# Patient Record
Sex: FEMALE | Race: WHITE | NOT HISPANIC OR LATINO
[De-identification: names, ages, dates, MRNs, and addresses within clinical notes are randomized per-mention and may not be internally consistent; named-entity substitution may affect disease eponyms.]

---

## 2018-08-23 ENCOUNTER — APPOINTMENT (OUTPATIENT)
Dept: CARDIOLOGY | Facility: CLINIC | Age: 68
End: 2018-08-23

## 2018-09-07 ENCOUNTER — EMERGENCY (EMERGENCY)
Facility: HOSPITAL | Age: 68
LOS: 1 days | Discharge: HOME | End: 2018-09-07
Attending: EMERGENCY MEDICINE | Admitting: EMERGENCY MEDICINE
Payer: MEDICARE

## 2018-09-07 VITALS
DIASTOLIC BLOOD PRESSURE: 70 MMHG | TEMPERATURE: 99 F | SYSTOLIC BLOOD PRESSURE: 127 MMHG | HEART RATE: 71 BPM | RESPIRATION RATE: 16 BRPM | OXYGEN SATURATION: 97 %

## 2018-09-07 VITALS
WEIGHT: 160.06 LBS | DIASTOLIC BLOOD PRESSURE: 90 MMHG | HEART RATE: 71 BPM | TEMPERATURE: 98 F | OXYGEN SATURATION: 100 % | RESPIRATION RATE: 18 BRPM | HEIGHT: 65 IN | SYSTOLIC BLOOD PRESSURE: 171 MMHG

## 2018-09-07 PROCEDURE — 73562 X-RAY EXAM OF KNEE 3: CPT | Mod: 26,RT

## 2018-09-07 PROCEDURE — 93971 EXTREMITY STUDY: CPT

## 2018-09-07 PROCEDURE — 93971 EXTREMITY STUDY: CPT | Mod: 26,RT

## 2018-09-07 PROCEDURE — 99284 EMERGENCY DEPT VISIT MOD MDM: CPT

## 2018-09-07 PROCEDURE — 73562 X-RAY EXAM OF KNEE 3: CPT

## 2018-09-07 PROCEDURE — 99284 EMERGENCY DEPT VISIT MOD MDM: CPT | Mod: 25

## 2018-09-07 RX ORDER — IBUPROFEN 200 MG
400 TABLET ORAL ONCE
Qty: 0 | Refills: 0 | Status: COMPLETED | OUTPATIENT
Start: 2018-09-07 | End: 2018-09-07

## 2018-09-07 RX ORDER — OXYCODONE HYDROCHLORIDE 5 MG/1
1 TABLET ORAL
Qty: 12 | Refills: 0
Start: 2018-09-07 | End: 2018-09-09

## 2018-09-07 RX ADMIN — Medication 400 MILLIGRAM(S): at 12:27

## 2018-09-07 RX ADMIN — Medication 400 MILLIGRAM(S): at 11:39

## 2018-09-07 NOTE — ED ADULT NURSE NOTE - OBJECTIVE STATEMENT
pt AO x3, ambulatory on her baseline, c/o atraumatic right popliteal pain for few weeks and getting worse today. Denies chest pain, dizziness, SOB or n/v at this time. Evaluated by provider. Xray on schedule. Due medication will be given as ordered. for pain. Awaiting further study. Will continue to monitor.

## 2018-09-07 NOTE — ED ADULT NURSE NOTE - NSIMPLEMENTINTERV_GEN_ALL_ED
Implemented All Universal Safety Interventions:  [place] to call system. Call bell, personal items and telephone within reach. Instruct patient to call for assistance. Room bathroom lighting operational. Non-slip footwear when patient is off stretcher. Physically safe environment: no spills, clutter or unnecessary equipment. Stretcher in lowest position, wheels locked, appropriate side rails in place.

## 2018-09-07 NOTE — ED PROVIDER NOTE - OBJECTIVE STATEMENT
Dr. Kaba: 68F no PMHx p/w atraumatic R popliteal pain x several weeks, worse today. Taking APAP with some relief. No chest pain or sob. No travel, no personal or fam h/o DVT/PE. No fevers or chills.

## 2018-09-07 NOTE — ED PROVIDER NOTE - ATTENDING CONTRIBUTION TO CARE
Dr. Kaba: I performed a face to face bedside interview with patient regarding history of present illness, review of symptoms and past medical history. I completed an independent physical exam.  I have discussed patient's plan of care with PA.   I agree with note as stated above, having amended the EMR as needed to reflect my findings.   This includes HISTORY OF PRESENT ILLNESS, HIV, PAST MEDICAL/SURGICAL/FAMILY/SOCIAL HISTORY, ALLERGIES AND HOME MEDICATIONS, REVIEW OF SYSTEMS, PHYSICAL EXAM, and any PROGRESS NOTES during the time I functioned as the attending physician for this patient.    Dr. Kaba: This H&P has been written by myself in its entirety

## 2018-09-20 ENCOUNTER — APPOINTMENT (OUTPATIENT)
Dept: ORTHOPEDIC SURGERY | Facility: CLINIC | Age: 68
End: 2018-09-20

## 2020-11-09 ENCOUNTER — APPOINTMENT (OUTPATIENT)
Dept: PHYSICAL MEDICINE AND REHAB | Facility: CLINIC | Age: 70
End: 2020-11-09
Payer: MEDICARE

## 2020-11-09 VITALS
OXYGEN SATURATION: 96 % | DIASTOLIC BLOOD PRESSURE: 95 MMHG | HEIGHT: 64 IN | HEART RATE: 97 BPM | TEMPERATURE: 98 F | SYSTOLIC BLOOD PRESSURE: 154 MMHG | BODY MASS INDEX: 27.83 KG/M2 | WEIGHT: 163 LBS

## 2020-11-09 DIAGNOSIS — Z80.0 FAMILY HISTORY OF MALIGNANT NEOPLASM OF DIGESTIVE ORGANS: ICD-10-CM

## 2020-11-09 DIAGNOSIS — R29.898 OTHER SYMPTOMS AND SIGNS INVOLVING THE MUSCULOSKELETAL SYSTEM: ICD-10-CM

## 2020-11-09 DIAGNOSIS — Z87.09 PERSONAL HISTORY OF OTHER DISEASES OF THE RESPIRATORY SYSTEM: ICD-10-CM

## 2020-11-09 DIAGNOSIS — Z78.9 OTHER SPECIFIED HEALTH STATUS: ICD-10-CM

## 2020-11-09 DIAGNOSIS — Z80.42 FAMILY HISTORY OF MALIGNANT NEOPLASM OF PROSTATE: ICD-10-CM

## 2020-11-09 DIAGNOSIS — Z86.79 PERSONAL HISTORY OF OTHER DISEASES OF THE CIRCULATORY SYSTEM: ICD-10-CM

## 2020-11-09 PROCEDURE — 99072 ADDL SUPL MATRL&STAF TM PHE: CPT

## 2020-11-09 PROCEDURE — 99203 OFFICE O/P NEW LOW 30 MIN: CPT

## 2020-11-10 PROBLEM — R29.898 COMPLAINTS OF LEG WEAKNESS: Status: ACTIVE | Noted: 2020-11-10

## 2020-11-10 RX ORDER — CHROMIUM 200 MCG
TABLET ORAL
Refills: 0 | Status: ACTIVE | COMMUNITY

## 2020-11-10 RX ORDER — VALSARTAN 320 MG/1
320 TABLET ORAL
Refills: 0 | Status: ACTIVE | COMMUNITY

## 2020-11-10 RX ORDER — ALPRAZOLAM 0.5 MG/1
0.5 TABLET ORAL
Refills: 0 | Status: ACTIVE | COMMUNITY

## 2020-11-10 RX ORDER — SERTRALINE HYDROCHLORIDE 100 MG/1
100 TABLET, FILM COATED ORAL
Refills: 0 | Status: ACTIVE | COMMUNITY

## 2020-11-10 NOTE — REVIEW OF SYSTEMS
[Negative] : Psychiatric [Recent Change In Weight] : ~T no recent weight change [Incontinence] : no incontinence [Joint Pain] : no joint pain [Joint Stiffness] : no joint stiffness [Skin Rash] : no skin rash [Headache] : no headaches [Dizziness] : no dizziness [Difficulty Walking] : no difficulty walking

## 2020-11-10 NOTE — ASSESSMENT
[FreeTextEntry1] : Ms. Ambrosio is a 70 year old female with 2 year history of left leg weakness that is now increased with recent falls. \par Discussed with patient differential diagnosis of spine related pathology versus peripheral nerve. \par Recommend basic laboratory including CBC, CMP, Vitamin B12, folate levels to be checked \par MRI lumbosacral spine \par Discussed with patient that she may need NCV/EMG \par PT to improve gait, balance and strengthening. Continue cane for walking.

## 2020-11-10 NOTE — HISTORY OF PRESENT ILLNESS
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 She states that her symptoms have been going on for more than 2 years. Due to her recent falls- 2 in a span of 1 month, she decided to pursue medical help. She had another fall remotely. She states that she trips and this results in a fall. She reports lower back pain about 4-5 months ago, but denies any recent back pain. She also reports left leg and arm tingling. Denies any weakness in left arm. She has been using a cane recently, but states that this is more to support the torn meniscus in right knee. If she is tired, she feels that her left leg drags. She denies any bladder or bowel issues.

## 2020-11-10 NOTE — PHYSICAL EXAM
[Normal] : Alert and in no acute distress [de-identified] : SANDRITA [de-identified] : R [de-identified] : Breathing comfrotably  [de-identified] : UE ROM WFL, LROM WFL, motor UE 5/5, Right LE 5/5, left HF 4+/5, abduction and adduction 5/5, Left knee extension: 5/5, Ankle DF appears mildly weak with repeated testing, EHL 5/5, Sensory inatct to light touch,  Proprioception ( hesitancy noted on left side, but accurate responses) Reflexes 2+ at biceps, BR, triceps, knees and ankles. Gait: mild hip hiking on left with reduced ankle range. Patient has difficulty standing on heels and toes unsupported, Romberg positve,  [de-identified] : No edema or calf tenderness

## 2020-11-10 NOTE — SOCIAL HISTORY
[Cane] : ~T cane [FreeTextEntry6] : She lives with family, in a house. She is independent with ADLs, \par + Driving

## 2020-12-07 NOTE — ED ADULT NURSE NOTE - NSHISCREENINGQ1_ED_A_ED
No [Questionnaire Received] : Patient questionnaire received [Intake Form Reviewed] : Patient intake form with past medical history, surgical history, family history and social history reviewed today [Urinary Incontinence] : urinary incontinence [Pelvic Strengthening] : pelvic strengthening

## 2021-12-16 ENCOUNTER — OUTPATIENT (OUTPATIENT)
Dept: OUTPATIENT SERVICES | Facility: HOSPITAL | Age: 71
LOS: 1 days | End: 2021-12-16
Payer: MEDICARE

## 2021-12-16 ENCOUNTER — APPOINTMENT (OUTPATIENT)
Dept: MRI IMAGING | Facility: CLINIC | Age: 71
End: 2021-12-16
Payer: MEDICARE

## 2021-12-16 DIAGNOSIS — Z00.8 ENCOUNTER FOR OTHER GENERAL EXAMINATION: ICD-10-CM

## 2021-12-16 PROCEDURE — 72141 MRI NECK SPINE W/O DYE: CPT | Mod: 26

## 2021-12-16 PROCEDURE — 72141 MRI NECK SPINE W/O DYE: CPT

## 2022-03-18 ENCOUNTER — TRANSCRIPTION ENCOUNTER (OUTPATIENT)
Age: 72
End: 2022-03-18

## 2022-05-16 ENCOUNTER — APPOINTMENT (OUTPATIENT)
Dept: ULTRASOUND IMAGING | Facility: HOSPITAL | Age: 72
End: 2022-05-16
Payer: MEDICARE

## 2022-05-16 ENCOUNTER — OUTPATIENT (OUTPATIENT)
Dept: OUTPATIENT SERVICES | Facility: HOSPITAL | Age: 72
LOS: 1 days | End: 2022-05-16
Payer: MEDICARE

## 2022-05-16 DIAGNOSIS — Z00.8 ENCOUNTER FOR OTHER GENERAL EXAMINATION: ICD-10-CM

## 2022-05-16 PROCEDURE — 76536 US EXAM OF HEAD AND NECK: CPT

## 2022-05-16 PROCEDURE — 76536 US EXAM OF HEAD AND NECK: CPT | Mod: 26

## 2023-03-21 ENCOUNTER — EMERGENCY (EMERGENCY)
Facility: HOSPITAL | Age: 73
LOS: 1 days | Discharge: HOME | End: 2023-03-21
Attending: EMERGENCY MEDICINE | Admitting: EMERGENCY MEDICINE
Payer: MEDICARE

## 2023-03-21 VITALS
HEART RATE: 76 BPM | RESPIRATION RATE: 16 BRPM | TEMPERATURE: 98 F | DIASTOLIC BLOOD PRESSURE: 74 MMHG | SYSTOLIC BLOOD PRESSURE: 109 MMHG | OXYGEN SATURATION: 98 %

## 2023-03-21 VITALS
TEMPERATURE: 98 F | SYSTOLIC BLOOD PRESSURE: 112 MMHG | HEART RATE: 76 BPM | DIASTOLIC BLOOD PRESSURE: 79 MMHG | OXYGEN SATURATION: 97 % | RESPIRATION RATE: 16 BRPM

## 2023-03-21 PROCEDURE — 73030 X-RAY EXAM OF SHOULDER: CPT

## 2023-03-21 PROCEDURE — 99285 EMERGENCY DEPT VISIT HI MDM: CPT | Mod: 25

## 2023-03-21 PROCEDURE — 99285 EMERGENCY DEPT VISIT HI MDM: CPT

## 2023-03-21 PROCEDURE — 73070 X-RAY EXAM OF ELBOW: CPT | Mod: 26,RT

## 2023-03-21 PROCEDURE — 73030 X-RAY EXAM OF SHOULDER: CPT | Mod: 26,RT,76

## 2023-03-21 PROCEDURE — 73060 X-RAY EXAM OF HUMERUS: CPT | Mod: 26,RT

## 2023-03-21 PROCEDURE — 73070 X-RAY EXAM OF ELBOW: CPT

## 2023-03-21 PROCEDURE — 73060 X-RAY EXAM OF HUMERUS: CPT

## 2023-03-21 PROCEDURE — 24505 CLTX HUMRL SHFT FX W/MNPJ: CPT | Mod: RT

## 2023-03-21 RX ORDER — IBUPROFEN 200 MG
1 TABLET ORAL
Qty: 28 | Refills: 0
Start: 2023-03-21 | End: 2023-03-27

## 2023-03-21 RX ORDER — IBUPROFEN 200 MG
600 TABLET ORAL ONCE
Refills: 0 | Status: COMPLETED | OUTPATIENT
Start: 2023-03-21 | End: 2023-03-21

## 2023-03-21 RX ORDER — DIAZEPAM 5 MG
2 TABLET ORAL ONCE
Refills: 0 | Status: DISCONTINUED | OUTPATIENT
Start: 2023-03-21 | End: 2023-03-21

## 2023-03-21 RX ORDER — VALSARTAN 80 MG/1
0 TABLET ORAL
Qty: 0 | Refills: 0 | DISCHARGE

## 2023-03-21 RX ORDER — MORPHINE SULFATE 50 MG/1
4 CAPSULE, EXTENDED RELEASE ORAL ONCE
Refills: 0 | Status: DISCONTINUED | OUTPATIENT
Start: 2023-03-21 | End: 2023-03-21

## 2023-03-21 RX ORDER — OXYCODONE AND ACETAMINOPHEN 5; 325 MG/1; MG/1
1 TABLET ORAL
Qty: 20 | Refills: 0
Start: 2023-03-21 | End: 2023-03-25

## 2023-03-21 RX ADMIN — Medication 600 MILLIGRAM(S): at 11:32

## 2023-03-21 RX ADMIN — MORPHINE SULFATE 4 MILLIGRAM(S): 50 CAPSULE, EXTENDED RELEASE ORAL at 13:08

## 2023-03-21 RX ADMIN — Medication 2 MILLIGRAM(S): at 13:01

## 2023-03-21 NOTE — ED PROVIDER NOTE - OBJECTIVE STATEMENT
71yo F PMHx MS presents to ED c/o right upper arm pain. States she was opening car door when she felt pain to R shoulder and mid-upper arm. No fall or trauma to area. Pt states she is unable to move arm without significant pain to area. Denies numbness, tingling, weakness, neck pain, fall, trauma, anticoagulation use, cp, sob.

## 2023-03-21 NOTE — ED PROVIDER NOTE - CLINICAL SUMMARY MEDICAL DECISION MAKING FREE TEXT BOX
71yo F presenting to ED c/o right upper arm pain after trying to open car door. XR shoulder, humerus 73yo F presenting to ED c/o right upper arm pain after trying to open car door. XR shoulder, humerus.      Patient is a 72-year-old white female with a history of multiple sclerosis who developed acute onset of right arm pain immediately upon opening car door.  Patient did not fall.  There was no direct trauma to the arm.  No numbness tingling to the arm.  This case will require evaluation as well as imaging followed by orthopedic consultation.

## 2023-03-21 NOTE — ED ADULT TRIAGE NOTE - AS PAIN REST
Post-Care Instructions: I reviewed with the patient in detail post-care instructions. Patient is to wear sunprotection, and avoid picking at any of the treated lesions. Pt may apply Vaseline to crusted or scabbing areas.
Detail Level: Detailed
Consent: The patient's consent was obtained including but not limited to risks of crusting, scabbing, blistering, scarring, darker or lighter pigmentary change, recurrence, incomplete removal and infection. The patient understands that the procedure is cosmetic in nature and is not covered by insurance.
Render Post-Care Instructions In Note?: yes
5 (moderate pain)

## 2023-03-21 NOTE — ED PROVIDER NOTE - PHYSICAL EXAMINATION
Gen: No acute distress, non toxic  HENT: NCAT, Mucous membranes moist, Oropharynx without exudates, uvula midline  Eyes: pink conjunctivae, EOMI, PERRL  CV: RRR, nl s1/s2.  Resp: CTAB, normal rate and effort  Neuro: A&O x 3, CN II-XII intact, sensorimotor intact without deficits, 5/5  str.   MSK: no midline c-spine ttp. Holding RUE fixed at side, flexed at elbow. +Pain with active and passive shoulder flexion. +TTP R upper arm/mid-humerus.   Vasc: Radial pulses 2+ b/l  Skin: No rashes. intact and perfused.

## 2023-03-21 NOTE — ED PROVIDER NOTE - NSFOLLOWUPINSTRUCTIONS_ED_ALL_ED_FT
- Return to the ED for any new or worsening symptoms.   - Follow-up with orthopedist for further evaluation and management of fracture    Fracture    A fracture is a break in one of your bones. This can occur from a variety of injuries, especially traumatic ones. Symptoms include pain, bruising, or swelling. Do not use the injured limb. If a fracture is in one of the bones below your waist, do not put weight on that limb unless instructed to do so by your healthcare provider. Crutches or a cane may have been provided. A splint or cast may have been applied by your health care provider. Make sure to keep it dry and follow up with an orthopedist as instructed.    SEEK IMMEDIATE MEDICAL CARE IF YOU HAVE ANY OF THE FOLLOWING SYMPTOMS: numbness, tingling, increasing pain, or weakness in any part of the injured limb. - Return to the ED for any new or worsening symptoms.   - Follow-up with orthopedist for further evaluation and management of fracture  - Remain non-weightbearing to right arm  - Remain in splint    Fracture    A fracture is a break in one of your bones. This can occur from a variety of injuries, especially traumatic ones. Symptoms include pain, bruising, or swelling. Do not use the injured limb. If a fracture is in one of the bones below your waist, do not put weight on that limb unless instructed to do so by your healthcare provider. Crutches or a cane may have been provided. A splint or cast may have been applied by your health care provider. Make sure to keep it dry and follow up with an orthopedist as instructed.    SEEK IMMEDIATE MEDICAL CARE IF YOU HAVE ANY OF THE FOLLOWING SYMPTOMS: numbness, tingling, increasing pain, or weakness in any part of the injured limb.

## 2023-03-21 NOTE — ED PROVIDER NOTE - WET READ LAUNCH FT
There are no Wet Read(s) to document. Patent There are 2 Wet Read(s) to document. There are 3 Wet Read(s) to document.

## 2023-03-21 NOTE — ED PROVIDER NOTE - NS ED ROS FT
Gen: denies fever, chills, fatigue, weight loss  Skin: denies rashes, laceration, bruising  HEENT: denies visual changes, ear pain, nasal congestion, throat pain  Respiratory: denies SALOMON, SOB, cough, wheezing  Cardiovascular: denies chest pain, palpitations, diaphoresis, LE edema  MSK: +R arm pain. Denies back pain, neck pain  Neuro: denies headache, dizziness, weakness, numbness

## 2023-03-21 NOTE — ED PROVIDER NOTE - PROGRESS NOTE DETAILS
ortho consulted for humerus fx. Pt reduced and braced by orthopedics. Post-reduction xrays demonstrate improved alignment. pt has orthopedist to f/u with.

## 2023-03-21 NOTE — ED PROVIDER NOTE - PATIENT PORTAL LINK FT
You can access the FollowMyHealth Patient Portal offered by [facility] by registering at the following website: [email or web address]. By joining [employer]’s FollowMyHealth portal, you will also be able to view your health information using other applications (apps) compatible with our system.

## 2023-03-21 NOTE — CONSULT NOTE ADULT - ATTENDING COMMENTS
73yo F with right humeral shaft fracture s/p injury earlier today. She has a history of MS.    On exam, TTP over right humeral shaft. Skin intact. No tenting. No open wounds. SILT over radial, ulnar, median, axillary nerve distributions. 2+ radial pulse. CR < 2s. No signs or symptoms of compartment syndrome.    X-rays show a multifragmentary humeral spiral fracture with varus alignment and encroaching on the humeral head.    RUE coaptation splint was applied after closed reduction. Post-reduction x-rays showed better alignment.     Plan is to followup closely as outpatient. Can discuss surgical versus nonop management.

## 2023-03-21 NOTE — ED PROVIDER NOTE - ATTENDING APP SHARED VISIT CONTRIBUTION OF CARE
Examination reveals a well-developed well-nourished white older female in no mild distress complaining of right arm pain examination of which reveals moderate tenderness to palpation with swelling right arm with intact neurovascular right upper extremity.  I agree with plan management outlined by PA.

## 2023-03-21 NOTE — ED ADULT NURSE NOTE - OBJECTIVE STATEMENT
pt was opening car door when arm gave out. fell into car on right arm. did not fall to floor, hit head, or have loc.  pt experiencing severe pain when moving arm. strong radial pulses present. equal  strength. no major deformities noted to eye. pt reports hx of MS.

## 2023-03-21 NOTE — CONSULT NOTE ADULT - SUBJECTIVE AND OBJECTIVE BOX
**** CHARTING IN PROGRESS ****     72y Female presents with R upper arm pain since this morning. RHD. Community ambulator w/ cane at baseline. Pt states she opened the car door and noticed immediate sharp pain in Right upper arm and shoulder. No trauma or fall. No hx of injury to right upper arm in the past. No hx of malignancy. No other injuries or complaints. Denies CP, SOB, fever, chills, numbness/tingling, weakness or any other complaints.     PAST MEDICAL & SURGICAL HISTORY:  No pertinent past medical history    Multiple sclerosis      No significant past surgical histor      Home Medications:  valsartan:  (21 Mar 2023 11:40)    Allergies    penicillins (Unknown)  sulfa drugs (Unknown)    Intolerances    Vital Signs Last 24 Hrs  T(C): 36.7 (21 Mar 2023 11:10), Max: 36.7 (21 Mar 2023 11:10)  T(F): 98 (21 Mar 2023 11:10), Max: 98 (21 Mar 2023 11:10)  HR: 76 (21 Mar 2023 11:10) (76 - 76)  BP: 112/79 (21 Mar 2023 11:10) (112/79 - 112/79)  BP(mean): --  RR: 16 (21 Mar 2023 11:10) (16 - 16)  SpO2: 97% (21 Mar 2023 11:10) (97% - 97%)    Parameters below as of 21 Mar 2023 11:10  Patient On (Oxygen Delivery Method): room air    PHYSICAL EXAM  General: NAD, Awake and Alert  RUE:   Skin intact  Moderate diffuse swelling at right upper arm w/ associated TTP  No TTP elsewhere along extremity  + AIN/PIN/U/M/Musc/Ax  2+ Radial  compartments soft and compressible      IMAGING:  XR : R humeral shaft fx    Assessment/Plan:  72y Female with Right humeral shaft fx.     - Reduced and placed in coaptation splint. NWB RUE in sling and splint.   -Pain control as needed  -DVT ppx per primary  - Ortho stable for dc  - Follow up w/ Dr. Finch in office in 1-2 weeks. Please call office for appointment.   -Will discuss plan w/ Dr. Finch and change accordingly.       **** CHARTING IN PROGRESS **** 72y Female presents with R upper arm pain since this morning. RHD. Community ambulator w/ cane at baseline. Pt states she opened the car door and noticed immediate sharp pain in Right upper arm and shoulder. No trauma or fall. No hx of injury to right upper arm in the past. No hx of malignancy. No other injuries or complaints. Denies CP, SOB, fever, chills, numbness/tingling, weakness or any other complaints.     PAST MEDICAL & SURGICAL HISTORY:  No pertinent past medical history    Multiple sclerosis      No significant past surgical histor      Home Medications:  valsartan:  (21 Mar 2023 11:40)    Allergies    penicillins (Unknown)  sulfa drugs (Unknown)    Intolerances    Vital Signs Last 24 Hrs  T(C): 36.7 (21 Mar 2023 11:10), Max: 36.7 (21 Mar 2023 11:10)  T(F): 98 (21 Mar 2023 11:10), Max: 98 (21 Mar 2023 11:10)  HR: 76 (21 Mar 2023 11:10) (76 - 76)  BP: 112/79 (21 Mar 2023 11:10) (112/79 - 112/79)  BP(mean): --  RR: 16 (21 Mar 2023 11:10) (16 - 16)  SpO2: 97% (21 Mar 2023 11:10) (97% - 97%)    Parameters below as of 21 Mar 2023 11:10  Patient On (Oxygen Delivery Method): room air    PHYSICAL EXAM  General: NAD, Awake and Alert  RUE:   Skin intact  Moderate diffuse swelling at right upper arm w/ associated TTP  No TTP elsewhere along extremity  + AIN/PIN/U/M/Musc/Ax  2+ Radial  compartments soft and compressible      IMAGING:  XR : R humeral shaft fx    Assessment/Plan:  72y Female with Right humeral shaft fx.     - Reduced and placed in coaptation splint. NWB RUE in sling and splint.   -Pain control as needed  -DVT ppx per primary  - Ortho stable for dc  - Follow up w/ Dr. Finch in office in 1-2 weeks. Please call office for appointment.   - Discussed plan w/ Dr. Finch who agrees.  72y Female presents with R upper arm pain since this morning. RHD. Community ambulator w/ cane at baseline. Pt states she opened the car door and noticed immediate sharp pain in Right upper arm and shoulder. No trauma or fall. No hx of injury to right upper arm in the past. No hx of malignancy. No other injuries or complaints. Denies CP, SOB, fever, chills, numbness/tingling, weakness or any other complaints.     PAST MEDICAL & SURGICAL HISTORY:  No pertinent past medical history    Multiple sclerosis      No significant past surgical histor      Home Medications:  valsartan:  (21 Mar 2023 11:40)    Allergies    penicillins (Unknown)  sulfa drugs (Unknown)    Intolerances    Vital Signs Last 24 Hrs  T(C): 36.7 (21 Mar 2023 11:10), Max: 36.7 (21 Mar 2023 11:10)  T(F): 98 (21 Mar 2023 11:10), Max: 98 (21 Mar 2023 11:10)  HR: 76 (21 Mar 2023 11:10) (76 - 76)  BP: 112/79 (21 Mar 2023 11:10) (112/79 - 112/79)  BP(mean): --  RR: 16 (21 Mar 2023 11:10) (16 - 16)  SpO2: 97% (21 Mar 2023 11:10) (97% - 97%)    Parameters below as of 21 Mar 2023 11:10  Patient On (Oxygen Delivery Method): room air    PHYSICAL EXAM  General: NAD, Awake and Alert  RUE:   Skin intact  Moderate diffuse swelling at right upper arm w/ associated TTP  No TTP elsewhere along extremity  + AIN/PIN/U/M/Musc/Ax  2+ Radial  compartments soft and compressible      IMAGING:  XR : R humeral shaft fx    Assessment/Plan:  72y Female with Right humeral shaft fx.     - Reduced and placed in coaptation splint. NWB RUE in sling and splint.   - Pain control as needed  - DVT ppx per primary  - Ortho stable for dc  - Follow up w/ Dr. Finch in office in 1-2 weeks. Please call office for appointment.   - Discussed plan w/ Dr. Finch who agrees.

## 2023-03-21 NOTE — ED PROVIDER NOTE - CARE PROVIDER_API CALL
Curtis García)  Orthopaedic Surgery  [address], [address]  [place] [address]  Phone: [phone]  Fax: [phone]  Follow Up Time:     Barry Finch)  Jason Leblanc  Physicians  [facility] [place] [address]  Phone: [phone]  Fax: [phone]  Follow Up Time:

## 2023-03-22 PROBLEM — G35 MULTIPLE SCLEROSIS: Chronic | Status: ACTIVE | Noted: 2023-03-21

## 2023-03-23 ENCOUNTER — APPOINTMENT (OUTPATIENT)
Dept: ORTHOPEDIC SURGERY | Facility: CLINIC | Age: 73
End: 2023-03-23
Payer: MEDICARE

## 2023-03-23 VITALS — WEIGHT: 148 LBS | HEIGHT: 65 IN | BODY MASS INDEX: 24.66 KG/M2

## 2023-03-23 PROCEDURE — 99204 OFFICE O/P NEW MOD 45 MIN: CPT

## 2023-03-24 NOTE — DISCUSSION/SUMMARY
[de-identified] : The underlying pathophysiology was reviewed with the patient. XR films were reviewed with the patient. Discussed at length the nature of the patient’s condition. The right upper extremity symptoms appear secondary to a displaced and comminuted fracture upper shaft right humerus\par \par At this time, we discussed treatment options consisting of operative and nonoperative management. I told her that the fracture is overall acceptably aligned and I would therefore recommended nonoperative management. She understands however that if it does show to displace with time, ORIF may be indicated at a later date. She will continue immobilized in the posterior splint rather than be transitioned to a humeral fracture brace as I discussed with her that it will likely be very painful to do so today. She was fitted with a new splint. I recommended continued ROM exercises of the wrist and digits to maintain motion and function. She was instructed to sleep either propped up in bed or in a recliner for comfort. I recommended Calcium Citrate, Vitamin D3 and Vitamin C to promote bone health and healing. She was instructed to wean off of Percocet and transition to Tylenol ES as well as Ibuprofen for pain.\par \par All questions answered, understanding verbalized. Patient in agreement with plan of care. Follow up in 2 weeks for repeat xrays and transition to a humeral fracture brace.

## 2023-03-24 NOTE — PHYSICAL EXAM
[de-identified] : Patient is WDWN, alert, and in no acute distress. Breathing is unlabored. She is grossly oriented to person, place, and time.\par \par She is accompanied by her  today. \par \par Right Upper Extremity:\par She presents immobilized in a posterior splint and sling, which she remained in for physical exam.\par ROM to the right shoulder was not accessed given injury and pain.\par Sensation intact along the deltoid.\par She has full writ and digital motion with intact sensation. \par  [de-identified] : EXAM: XR HUMERUS MIN 2 VIEWS RT\par EXAM: XR HUMERUS MIN 2 VIEWS RT\par EXAM: XR SHOULDER COMP MIN 2V RT\par EXAM: XR ELBOW POST RED AP LAT 2V RT\par PROCEDURE DATE: 03/21/2023\par IMPRESSION: \par Improved alignment of the humeral fracture and splinting.\par \par BRENNAN FISHER MD; Attending Radiologist\par This document has been electronically signed. Mar 21 2023 \par \par ------------------------------------------------------------------------------------------------------------------------------------------------------------------------------------\par \par EXAM: XR HUMERUS MIN 2 VIEWS RT ORDERED BY: DEBBY CLAROS\par EXAM: XR SHOULDER COMP MIN 2V RT ORDERED BY: DEBBY CLAROS\par PROCEDURE DATE: 03/21/2023\par IMPRESSION: \par Long segment comminuted fracture upper shaft right humerus with angulation and displacement.\par \par BRENNAN FISHER MD; Attending Radiologist\par This document has been electronically signed. Mar 21 2023 1:22PM

## 2023-03-24 NOTE — END OF VISIT
[FreeTextEntry3] : All medical record entries made by the Scribe were at my,  Dr. Curtis García MD., direction and personally dictated by me on 03/23/2023. I have personally reviewed the chart and agree that the record accurately reflects my personal performance of the history, physical exam, assessment and plan.

## 2023-03-24 NOTE — ADDENDUM
[FreeTextEntry1] : I, Eunice Bradshaw wrote this note acting as a scribe for Dr. Curtis García on Mar 23, 2023.

## 2023-04-10 ENCOUNTER — APPOINTMENT (OUTPATIENT)
Dept: ORTHOPEDIC SURGERY | Facility: CLINIC | Age: 73
End: 2023-04-10
Payer: MEDICARE

## 2023-04-10 PROCEDURE — 99213 OFFICE O/P EST LOW 20 MIN: CPT

## 2023-04-10 PROCEDURE — 73060 X-RAY EXAM OF HUMERUS: CPT

## 2023-04-10 NOTE — END OF VISIT
[FreeTextEntry3] : All medical record entries made by the Scribe were at my,  Dr. Curtis García MD., direction and personally dictated by me on 04/10/2023. I have personally reviewed the chart and agree that the record accurately reflects my personal performance of the history, physical exam, assessment and plan.

## 2023-04-10 NOTE — ADDENDUM
[FreeTextEntry1] : I, Eunice Bradshaw wrote this note acting as a scribe for Dr. Curtis García on Apr 10, 2023.

## 2023-04-10 NOTE — HISTORY OF PRESENT ILLNESS
[de-identified] : Pt is a RHD 71 y/o female for evaluation of right arm injury post opening her car door. She denies a fall. She states upon opening the car door, she felt an immediate crack in her arm. She went to [facility] and had x-rays done on 3/24/23 and revealed a comminuted fracture the upper shaft of the right humerus with displacement. She was initially seen in the office on 3/23/23 at which time I recommended nonoperative management due to the alignment of the fracture. She returns on 4/10/23 and is doing well. She notes little to no pain but states if she does require medication, she will take Tylenol. She states she experiences a clicking sensation. She is taking Calcium and Vitamin D. \par \par She was diagnosed with MS about 1 year ago but notes she ignored prior symptoms for about 20 years and sustained regular falls.

## 2023-04-10 NOTE — PHYSICAL EXAM
[de-identified] : Patient is WDWN, alert, and in no acute distress. Breathing is unlabored. She is grossly oriented to person, place, and time.\par \par She is accompanied by her  today. \par \par Right Upper Extremity:\par She presents immobilized in a posterior splint and sling, which she remained in for physical exam.\par ROM to the right shoulder was not accessed given injury and pain.\par Sensation intact along the deltoid.\par She has full writ and digital motion with intact sensation.  [de-identified] : AP and lateral views of the RIGHT humerus were obtained today and revealed a long segment comminuted fracture upper shaft right humerus.\par \par ------------------------------------------------------------------------------------------------------------------------------------------------------------------------------------ \par \par EXAM: XR HUMERUS MIN 2 VIEWS RT\par EXAM: XR HUMERUS MIN 2 VIEWS RT\par EXAM: XR SHOULDER COMP MIN 2V RT\par EXAM: XR ELBOW POST RED AP LAT 2V RT\par PROCEDURE DATE: 03/21/2023\par IMPRESSION: \par Improved alignment of the humeral fracture and splinting.\par \par BRENNAN FISHER MD; Attending Radiologist\par This document has been electronically signed. Mar 21 2023 \par \par ------------------------------------------------------------------------------------------------------------------------------------------------------------------------------------\par \par EXAM: XR HUMERUS MIN 2 VIEWS RT ORDERED BY: DEBBY CLAROS\par EXAM: XR SHOULDER COMP MIN 2V RT ORDERED BY: DEBBY CLAROS\par PROCEDURE DATE: 03/21/2023\par IMPRESSION: \par Long segment comminuted fracture upper shaft right humerus with angulation and displacement.\par \par BRENNAN FISHER MD; Attending Radiologist\par This document has been electronically signed. Mar 21 2023 1:22PM

## 2023-04-10 NOTE — DISCUSSION/SUMMARY
[de-identified] : The underlying pathophysiology was reviewed with the patient. XR films were reviewed with the patient. Discussed at length the nature of the patient’s condition. The right upper extremity symptoms appear secondary to a displaced and comminuted fracture upper shaft right humerus\par \par At this time, I discussed with her and her  that the fracture will take at a minimum 6 weeks to heal. I recommended continued nonoperative management based upon the alignment of the fracture as of today as well as maintenance of the splint as she is currently comfortable and notes a decrease in pain overall. At the 4 week anne, she will likely be transitioned to a humeral fracture brace.\par \par All questions answered, understanding verbalized. Patient in agreement with plan of care. Follow up in 2 weeks for repeat xrays.

## 2023-04-27 ENCOUNTER — APPOINTMENT (OUTPATIENT)
Dept: ORTHOPEDIC SURGERY | Facility: CLINIC | Age: 73
End: 2023-04-27
Payer: MEDICARE

## 2023-04-27 DIAGNOSIS — S42.309A UNSPECIFIED FRACTURE OF SHAFT OF HUMERUS, UNSPECIFIED ARM, INITIAL ENCOUNTER FOR CLOSED FRACTURE: ICD-10-CM

## 2023-04-27 PROCEDURE — 73060 X-RAY EXAM OF HUMERUS: CPT

## 2023-04-27 PROCEDURE — 99213 OFFICE O/P EST LOW 20 MIN: CPT

## 2023-04-27 NOTE — DISCUSSION/SUMMARY
[de-identified] : The underlying pathophysiology was reviewed with the patient. XR films were reviewed with the patient. Discussed at length the nature of the patient’s condition. The right upper extremity symptoms appear secondary to a displaced and comminuted fracture upper shaft right humerus\par \par At this time, I reviewed the xrays in the great detail with the patient and her . I explained to them, that based off of today's xrays, there is some evidence of healing seen and it is a good sign that she does not currently have pain. With that being said, the fracture is comminuted and displaced, resulting in a cosmetic deformity to the upper extremity overlying the humerus. We discussed further treatment options of operative versus nonoperative management. I explained to her and her  that the only way to correct the deformity and realign the fracture would be ORIF. She and her  stated they are not interested in surgery. Furthermore, she stated that she is not bothered by the deformity. She has therefore deferred operative management of ORIF and is not consenting to surgery. She has also accepted the deformity as of her office visit today on 4/27/23. Finally, I did explain to her and her  that there are no guarantees the fracture will heel with continued nonoperative management and if this is the case, ORIF may ultimately be indicated at a later point in time.\par \par All questions answered, understanding verbalized. Patient in agreement with plan of care. Follow up in 4 weeks for repeat xrays.

## 2023-04-27 NOTE — PHYSICAL EXAM
[de-identified] : Patient is WDWN, alert, and in no acute distress. Breathing is unlabored. She is grossly oriented to person, place, and time.\par \par She is accompanied by her  today. \par \par Right Upper Extremity:\par She presents immobilized in a posterior splint and sling, which were removed for physical exam.\par After the splint was removed, no evidence of skin breakdown or lesions.\par There is a prominence noted along the upper extremity, overlying the midshaft of the humerus, due to the displacement of the fracture.\par There is residual edema and ecchymosis noted diffusely to the upper extremity.\par ROM to the right shoulder was accessed passively, with \par ROM to the right elbow: limited given placement of the long arm splint for 5 weeks.\par Sensation intact along the deltoid.\par She has full wrist and digital motion with tingling to the digits. [de-identified] : AP and lateral views of the RIGHT humerus were obtained today and revealed a long segment comminuted fracture upper shaft right humerus. There are signs of interval healing. \par \par ------------------------------------------------------------------------------------------------------------------------------------------------------------------------------------ \par \par EXAM: XR HUMERUS MIN 2 VIEWS RT\par EXAM: XR HUMERUS MIN 2 VIEWS RT\par EXAM: XR SHOULDER COMP MIN 2V RT\par EXAM: XR ELBOW POST RED AP LAT 2V RT\par PROCEDURE DATE: 03/21/2023\par IMPRESSION: \par Improved alignment of the humeral fracture and splinting.\par \par BRENNAN FISHER MD; Attending Radiologist\par This document has been electronically signed. Mar 21 2023 \par \par ------------------------------------------------------------------------------------------------------------------------------------------------------------------------------------\par \par EXAM: XR HUMERUS MIN 2 VIEWS RT ORDERED BY: DEBBY CLAROS\par EXAM: XR SHOULDER COMP MIN 2V RT ORDERED BY: DEBBY CLAROS\angle PROCEDURE DATE: 03/21/2023\par IMPRESSION: \par Long segment comminuted fracture upper shaft right humerus with angulation and displacement.\par \par BRENNAN FISHER MD; Attending Radiologist\par This document has been electronically signed. Mar 21 2023 1:22PM

## 2023-04-27 NOTE — HISTORY OF PRESENT ILLNESS
[de-identified] : Pt is a RHD 71 y/o female for evaluation of right arm injury post opening her car door. She denies a fall. She states upon opening the car door, she felt an immediate crack in her arm. She went to [facility] and had x-rays done on 3/24/23 and revealed a comminuted fracture the upper shaft of the right humerus with displacement. She was initially seen in the office on 3/23/23 at which time I recommended nonoperative management due to the alignment of the fracture. She returned on 4/10/23 and was doing well. She noted little to no pain but stated if she did require medication, she would take Tylenol. She noted a clicking sensation. She returns on 4/27/23 for repeat xrays and reports to be improving. She still has limited motion to the shoulder actively. She reports tingling to her right hand. She denies pain and is no longer taking pain medication such as Tylenol.\par \par She was diagnosed with MS about 1 year ago but notes she ignored prior symptoms for about 20 years and sustained regular falls.

## 2023-04-27 NOTE — ADDENDUM
[FreeTextEntry1] : I, Eunice Bradshaw wrote this note acting as a scribe for Dr. Curtis García on Apr 27, 2023.

## 2023-04-27 NOTE — END OF VISIT
[FreeTextEntry3] : All medical record entries made by the Scribe were at my,  Dr. Curtis García MD., direction and personally dictated by me on 04/27/2023. I have personally reviewed the chart and agree that the record accurately reflects my personal performance of the history, physical exam, assessment and plan.

## 2023-05-30 ENCOUNTER — APPOINTMENT (OUTPATIENT)
Dept: ORTHOPEDIC SURGERY | Facility: CLINIC | Age: 73
End: 2023-05-30
Payer: MEDICARE

## 2023-05-30 VITALS — HEIGHT: 64 IN | WEIGHT: 143 LBS | BODY MASS INDEX: 24.41 KG/M2

## 2023-05-30 PROCEDURE — 99213 OFFICE O/P EST LOW 20 MIN: CPT

## 2023-05-30 PROCEDURE — 73060 X-RAY EXAM OF HUMERUS: CPT

## 2023-05-30 NOTE — HISTORY OF PRESENT ILLNESS
[de-identified] : Pt is a RHD 73 y/o female for evaluation of right arm injury post opening her car door. She denies a fall. She states upon opening the car door, she felt an immediate crack in her arm. She went to [facility] and had x-rays done on 3/24/23 and revealed a comminuted fracture the upper shaft of the right humerus with displacement. She was initially seen in the office on 3/23/23 at which time I recommended nonoperative management due to the alignment of the fracture. She has been following up since her initial visit with repeat xrays and has noted improvements. She returns for repeat xrays once again on 5/30/23 and is progressing well. She has not yet began PT but would like to go. She has improvements in motion since her last visit about 1 month ago. \par \par She was diagnosed with MS about 1 year ago but notes she ignored prior symptoms for about 20 years and sustained regular falls.

## 2023-05-30 NOTE — END OF VISIT
[FreeTextEntry3] : All medical record entries made by the Scribe were at my,  Dr. Curtis García MD., direction and personally dictated by me on 05/30/2023. I have personally reviewed the chart and agree that the record accurately reflects my personal performance of the history, physical exam, assessment and plan.

## 2023-05-30 NOTE — DISCUSSION/SUMMARY
[de-identified] : The underlying pathophysiology was reviewed with the patient. XR films were reviewed with the patient. Discussed at length the nature of the patient’s condition. The right upper extremity symptoms appear secondary to a displaced and comminuted fracture upper shaft right humerus\par \par At this time, I reviewed the xrays in the great detail with the patient and her . I explained to them that there are increased signs of healing along the fracture site, which is a great sign. I recommended she continue with ROM exercises of the shoulder and begin a course of physical therapy. I did tell her to use caution as again the fracture is not fully healed and if she were to fall, she is at risk for reinjury.\par The patient wishes to proceed with physical therapy. (ROM and strengthening as tolerated). A script was given.\par \par All questions answered, understanding verbalized. Patient in agreement with plan of care. Follow up in 3 months for repeat xrays.

## 2023-05-30 NOTE — PHYSICAL EXAM
[de-identified] : Patient is WDWN, alert, and in no acute distress. Breathing is unlabored. She is grossly oriented to person, place, and time.\par \par She is accompanied by her  today. \par \par Right Upper Extremity:\par She presents immobilized in a posterior splint and sling, which were removed for physical exam.\par After the splint was removed, no evidence of skin breakdown or lesions.\par There is a prominence noted along the upper extremity, overlying the midshaft of the humerus, due to the displacement of the fracture.\par There is residual edema and ecchymosis noted diffusely to the upper extremity.\par ROM to the right shoulder was accessed passively, with \par ROM to the right elbow: limited given placement of the long arm splint for 5 weeks.\par Sensation intact along the deltoid.\par She has full wrist and digital motion with tingling to the digits. [de-identified] : AP and lateral views of the RIGHT humerus were obtained today and revealed a long segment comminuted fracture upper shaft right humerus. There are signs of interval healing. \par \par ------------------------------------------------------------------------------------------------------------------------------------------------------------------------------------ \par \par EXAM: XR HUMERUS MIN 2 VIEWS RT\par EXAM: XR HUMERUS MIN 2 VIEWS RT\par EXAM: XR SHOULDER COMP MIN 2V RT\par EXAM: XR ELBOW POST RED AP LAT 2V RT\par PROCEDURE DATE: 03/21/2023\par IMPRESSION: \par Improved alignment of the humeral fracture and splinting.\par \par BRENNAN FISHER MD; Attending Radiologist\par This document has been electronically signed. Mar 21 2023 \par \par ------------------------------------------------------------------------------------------------------------------------------------------------------------------------------------\par \par EXAM: XR HUMERUS MIN 2 VIEWS RT ORDERED BY: DEBBY CLAROS\par EXAM: XR SHOULDER COMP MIN 2V RT ORDERED BY: DEBBY CLAROS\angle PROCEDURE DATE: 03/21/2023\par IMPRESSION: \par Long segment comminuted fracture upper shaft right humerus with angulation and displacement.\par \par BRENNAN FISHER MD; Attending Radiologist\par This document has been electronically signed. Mar 21 2023 1:22PM

## 2023-09-05 ENCOUNTER — APPOINTMENT (OUTPATIENT)
Dept: ORTHOPEDIC SURGERY | Facility: CLINIC | Age: 73
End: 2023-09-05
Payer: MEDICARE

## 2023-09-05 DIAGNOSIS — S42.361D: ICD-10-CM

## 2023-09-05 PROCEDURE — 73060 X-RAY EXAM OF HUMERUS: CPT

## 2023-09-05 PROCEDURE — 99213 OFFICE O/P EST LOW 20 MIN: CPT

## 2023-09-05 NOTE — HISTORY OF PRESENT ILLNESS
[de-identified] : Pt is a RHD 71 y/o female for evaluation of right arm injury post opening her car door. She denies a fall. She states upon opening the car door, she felt an immediate crack in her arm. She went to [facility] and had x-rays done on 3/24/23 and revealed a comminuted fracture the upper shaft of the right humerus with displacement. She was initially seen in the office on 3/23/23 at which time I recommended nonoperative management due to the alignment of the fracture. She has been following up since her initial visit with repeat xrays and has noted improvements. She returns for repeat xrays once again on 5/30/23 and is progressing well. She has not yet began PT but would like to go. She has improvements in motion since her last visit about 1 month ago. She returns today on 9/5/23 for a follow up. Patient claims to have improved and has no complaints.   She was diagnosed with MS about 1 year ago but notes she ignored prior symptoms for about 20 years and sustained regular falls.

## 2023-09-05 NOTE — END OF VISIT
[FreeTextEntry3] : All medical record entries made by the Scribe were at my, Dr. Curtis García MD., direction and personally dictated by me on 09/05/2023. I have personally reviewed the chart and agree that the record accurately reflects my personal performance of the history, physical exam, assessment and plan.

## 2023-09-05 NOTE — PHYSICAL EXAM
[de-identified] : Patient is WDWN, alert, and in no acute distress. Breathing is unlabored. She is grossly oriented to person, place, and time.\par  \par  She is accompanied by her  today. \par  \par  Right Upper Extremity:\par  She presents immobilized in a posterior splint and sling, which were removed for physical exam.\par  After the splint was removed, no evidence of skin breakdown or lesions.\par  There is a prominence noted along the upper extremity, overlying the midshaft of the humerus, due to the displacement of the fracture.\par  There is residual edema and ecchymosis noted diffusely to the upper extremity.\par  ROM to the right shoulder was accessed passively, with \par  ROM to the right elbow: limited given placement of the long arm splint for 5 weeks.\par  Sensation intact along the deltoid.\par  She has full wrist and digital motion with tingling to the digits. [de-identified] : AP, lateral and oblique views of the RIGHT humerus were obtained today and revealed a healed humerus. ---------------------------------------------------------------------------------------------------------------------------------------------------------------------------------- AP and lateral views of the RIGHT humerus were obtained today and revealed a long segment comminuted fracture upper shaft right humerus. There are signs of interval healing.   ------------------------------------------------------------------------------------------------------------------------------------------------------------------------------------   EXAM: XR HUMERUS MIN 2 VIEWS RT EXAM: XR HUMERUS MIN 2 VIEWS RT EXAM: XR SHOULDER COMP MIN 2V RT EXAM: XR ELBOW POST RED AP LAT 2V RT PROCEDURE DATE: 03/21/2023 IMPRESSION:  Improved alignment of the humeral fracture and splinting.  BRENNAN FISHER MD; Attending Radiologist This document has been electronically signed. Mar 21 2023   ------------------------------------------------------------------------------------------------------------------------------------------------------------------------------------  EXAM: XR HUMERUS MIN 2 VIEWS RT ORDERED BY: DEBBY CLAROS EXAM: XR SHOULDER COMP MIN 2V RT ORDERED BY: DEBBY CLAROS PROCEDURE DATE: 03/21/2023 IMPRESSION:  Long segment comminuted fracture upper shaft right humerus with angulation and displacement.  BRENNAN FISHER MD; Attending Radiologist This document has been electronically signed. Mar 21 2023 1:22PM

## 2023-09-05 NOTE — ADDENDUM
[FreeTextEntry1] : I, Giles Ramírez wrote this note acting as a scribe for Dr. Curtis García on Sep 05, 2023

## 2023-09-05 NOTE — DISCUSSION/SUMMARY
[de-identified] : The underlying pathophysiology was reviewed with the patient. XR films were reviewed with the patient. Discussed at length the nature of the patient's condition. The right upper extremity symptoms appear secondary to a displaced and comminuted fracture upper shaft right humerus  At this time, I reviewed the xrays in the great detail with the patient and her . I explained to them that there are increased signs of healing along the fracture site, which is a great sign. I recommended she continue with ROM exercises of the shoulder and begin a course of physical therapy. I did tell her to use caution as again the fracture is not fully healed and if she were to fall, she is at risk for reinjury. The patient wishes to proceed with physical therapy. (ROM and strengthening as tolerated). A script was given. During her follow up on 9/5/23 we discussed her new X rays and I told her everything looked to be completely healed and she can return to activities with no restriction,   All questions answered, understanding verbalized. Patient in agreement with plan of care. Follow up as needed.

## 2024-01-31 ENCOUNTER — APPOINTMENT (OUTPATIENT)
Dept: INTERNAL MEDICINE | Facility: CLINIC | Age: 74
End: 2024-01-31

## 2024-02-02 NOTE — ED PROVIDER NOTE - WR ORDER STATUS 2
[General Appearance - Well Developed] : well developed [Normal Appearance] : normal appearance [Well Groomed] : well groomed [General Appearance - Well Nourished] : well nourished [No Deformities] : no deformities [General Appearance - In No Acute Distress] : no acute distress [Heart Rate And Rhythm] : heart rate and rhythm were normal [Heart Sounds] : normal S1 and S2 [Murmurs] : no murmurs present [Respiration, Rhythm And Depth] : normal respiratory rhythm and effort [Exaggerated Use Of Accessory Muscles For Inspiration] : no accessory muscle use [Auscultation Breath Sounds / Voice Sounds] : lungs were clear to auscultation bilaterally [Clean] : clean [Dry] : dry [Well-Healed] : well-healed [Abdomen Soft] : soft [Abdomen Tenderness] : non-tender [Abdomen Mass (___ Cm)] : no abdominal mass palpated [Nail Clubbing] : no clubbing of the fingernails [Cyanosis, Localized] : no localized cyanosis [Petechial Hemorrhages (___cm)] : no petechial hemorrhages [] : no ischemic changes Performed

## 2025-01-20 ENCOUNTER — APPOINTMENT (OUTPATIENT)
Dept: INTERNAL MEDICINE | Facility: CLINIC | Age: 75
End: 2025-01-20

## 2025-05-18 ENCOUNTER — EMERGENCY (EMERGENCY)
Facility: HOSPITAL | Age: 75
LOS: 1 days | End: 2025-05-18
Attending: INTERNAL MEDICINE | Admitting: INTERNAL MEDICINE
Payer: MEDICARE

## 2025-05-18 VITALS
TEMPERATURE: 98 F | HEART RATE: 64 BPM | OXYGEN SATURATION: 99 % | RESPIRATION RATE: 16 BRPM | WEIGHT: 145.06 LBS | HEIGHT: 62 IN | DIASTOLIC BLOOD PRESSURE: 62 MMHG | SYSTOLIC BLOOD PRESSURE: 145 MMHG

## 2025-05-18 LAB
APPEARANCE UR: ABNORMAL
BILIRUB UR-MCNC: NEGATIVE — SIGNIFICANT CHANGE UP
COLOR SPEC: SIGNIFICANT CHANGE UP
DIFF PNL FLD: ABNORMAL
GLUCOSE UR QL: NEGATIVE MG/DL — SIGNIFICANT CHANGE UP
KETONES UR QL: NEGATIVE MG/DL — SIGNIFICANT CHANGE UP
LEUKOCYTE ESTERASE UR-ACNC: ABNORMAL
NITRITE UR-MCNC: NEGATIVE — SIGNIFICANT CHANGE UP
PH UR: 7.5 — SIGNIFICANT CHANGE UP (ref 5–8)
PROT UR-MCNC: 100 MG/DL
SP GR SPEC: 1.02 — SIGNIFICANT CHANGE UP (ref 1–1.03)
UROBILINOGEN FLD QL: 0.2 MG/DL — SIGNIFICANT CHANGE UP (ref 0.2–1)

## 2025-05-18 PROCEDURE — 87086 URINE CULTURE/COLONY COUNT: CPT

## 2025-05-18 PROCEDURE — 99283 EMERGENCY DEPT VISIT LOW MDM: CPT | Mod: 25

## 2025-05-18 PROCEDURE — 81001 URINALYSIS AUTO W/SCOPE: CPT

## 2025-05-18 PROCEDURE — 99284 EMERGENCY DEPT VISIT MOD MDM: CPT

## 2025-05-18 RX ORDER — CEFUROXIME SODIUM 1.5 G
1 VIAL (EA) INJECTION
Qty: 20 | Refills: 0
Start: 2025-05-18 | End: 2025-05-27

## 2025-05-18 RX ORDER — PHENAZOPYRIDINE HCL 100 MG
1 TABLET ORAL
Qty: 9 | Refills: 0
Start: 2025-05-18 | End: 2025-05-20

## 2025-05-18 RX ORDER — PHENAZOPYRIDINE HCL 100 MG
200 TABLET ORAL ONCE
Refills: 0 | Status: COMPLETED | OUTPATIENT
Start: 2025-05-18 | End: 2025-05-18

## 2025-05-18 RX ORDER — CEFUROXIME SODIUM 1.5 G
500 VIAL (EA) INJECTION ONCE
Refills: 0 | Status: COMPLETED | OUTPATIENT
Start: 2025-05-18 | End: 2025-05-18

## 2025-05-18 RX ADMIN — Medication 500 MILLIGRAM(S): at 17:55

## 2025-05-18 RX ADMIN — Medication 200 MILLIGRAM(S): at 17:55

## 2025-05-18 NOTE — ED PROVIDER NOTE - PHYSICAL EXAMINATION

## 2025-05-18 NOTE — ED PROVIDER NOTE - CLINICAL SUMMARY MEDICAL DECISION MAKING FREE TEXT BOX
74-year-old female with past medical history of MS and hypertension presents today due to concern for urinary tract infection.  Patient is experiencing dark urine, urinary frequency, and dysuria.  Patient denies flank pain, abdominal pain, vomiting, fever, or any other complaints. VSS Exam stable, U/A was positive for UTI, DCed on Oral AB, stable for DC to OP care

## 2025-05-18 NOTE — ED PROVIDER NOTE - NSFOLLOWUPINSTRUCTIONS_ED_ALL_ED_FT
Follow up with urology. Return for fever, vomiting, flank pain, worsening condition    Urinary Tract Infection, Female  The female urinary system, including the kidneys, ureters, bladder, and urethra.  A urinary tract infection (UTI) is an infection in your urinary tract. The urinary tract is made up of organs that make, store, and get rid of pee (urine) in your body. These organs include:  The kidneys.  The ureters.  The bladder.  The urethra.  What are the causes?  Most UTIs are caused by germs called bacteria. They may be in or near your genitals. These germs grow and cause swelling in your urinary tract.    What increases the risk?  You're more likely to get a UTI if:  You're a female. The urethra is shorter in females than in males.  You have a soft tube called a catheter that drains your pee.  You can't control when you pee or poop.  You have trouble peeing because of:  A kidney stone.  A urinary blockage.  A nerve condition that affects your bladder.  Not getting enough to drink.  You're sexually active.  You use a birth control inside your vagina, like spermicide.  You're pregnant.  You have low levels of the hormone estrogen in your body.  You're an older adult.  You're also more likely to get a UTI if you have other health problems. These may include:  Diabetes.  A weak immune system. Your immune system is your body's defense system.  Sickle cell disease.  Injury of the spine.  What are the signs or symptoms?  Symptoms may include:  Needing to pee right away.  Peeing small amounts often.  Pain or burning when you pee.  Blood in your pee.  Pee that smells bad or odd.  Pain in your belly or lower back.  You may also:  Feel confused. This may be the first symptom in older adults.  Vomit.  Not feel hungry.  Feel tired or easily annoyed.  Have a fever or chills.  How is this diagnosed?  A UTI is diagnosed based on your medical history and an exam. You may also have other tests. These may include:  Pee tests.  Blood tests.  Tests for sexually transmitted infections (STIs).  If you've had more than one UTI, you may need to have imaging studies done to find out why you keep getting them.    How is this treated?  A UTI can be treated by:  Taking antibiotics or other medicines.  Drinking enough fluid to keep your pee pale yellow.  In rare cases, a UTI can cause a very bad condition called sepsis. Sepsis may be treated in the hospital.    Follow these instructions at home:  Medicines    Take your medicines only as told by your health care provider.  If you were given antibiotics, take them as told by your provider. Do not stop taking them even if you start to feel better.  General instructions    Make sure you:  Pee often and fully. Do not hold your pee for a long time.  Wipe from front to back after you pee or poop. Use each tissue only once when you wipe.  Pee after you have sex.  Do not douche or use sprays or powders in your genital area.  Contact a health care provider if:  Your symptoms don't get better after 1–2 days of taking antibiotics.  Your symptoms go away and then come back.  You have a fever or chills.  You vomit or feel like you may vomit.  Get help right away if:  You have very bad pain in your back or lower belly.  You faint.  This information is not intended to replace advice given to you by your health care provider. Make sure you discuss any questions you have with your health care provider.    Document Revised: 11/27/2024 Document Reviewed: 03/22/2024  Elsevier Patient Education © 2025 [employer]

## 2025-05-18 NOTE — ED ADULT NURSE NOTE - NSFALLUNIVINTERV_ED_ALL_ED
Bed/Stretcher in lowest position, wheels locked, appropriate side rails in place/Call bell, personal items and telephone in reach/Instruct patient to call for assistance before getting out of bed/chair/stretcher/Non-slip footwear applied when patient is off stretcher/[place] to call system/Physically safe environment - no spills, clutter or unnecessary equipment/Purposeful proactive rounding/Room/bathroom lighting operational, light cord in reach

## 2025-05-18 NOTE — ED PROVIDER NOTE - PATIENT PORTAL LINK FT
You can access the FollowMyHealth Patient Portal offered by [facility] by registering at the following website: [email or web address]. By joining [employer]’s FollowMyHealth portal, you will also be able to view your health information using other applications (apps) compatible with our system.

## 2025-05-18 NOTE — ED PROVIDER NOTE - OBJECTIVE STATEMENT
74-year-old female with past medical history of MS and hypertension presents today due to concern for urinary tract infection.  Patient is experiencing dark urine, urinary frequency, and dysuria.  Patient denies flank pain, abdominal pain, vomiting, fever, or any other complaints.

## 2025-05-18 NOTE — ED PROVIDER NOTE - CARE PROVIDER_API CALL
Emery Brasher Mitchell  Urology  [address], [address]  [place] [address]  Phone: [phone]  Fax: [phone]  Follow Up Time: 1-3 Days

## 2025-05-18 NOTE — ED ADULT NURSE NOTE - OBJECTIVE STATEMENT
Pt received in bed alert and oriented with the c/o frequent painful dark colored urine today. As per Md's  orders urine specimen obtained and sent to lab. Meds given and tolerated well. Pt stable and d/c home with

## 2025-05-28 ENCOUNTER — APPOINTMENT (OUTPATIENT)
Dept: UROLOGY | Facility: CLINIC | Age: 75
End: 2025-05-28
Payer: MEDICARE

## 2025-05-28 VITALS
OXYGEN SATURATION: 95 % | DIASTOLIC BLOOD PRESSURE: 84 MMHG | BODY MASS INDEX: 24.41 KG/M2 | TEMPERATURE: 98 F | HEART RATE: 105 BPM | WEIGHT: 143 LBS | HEIGHT: 64 IN | RESPIRATION RATE: 16 BRPM | SYSTOLIC BLOOD PRESSURE: 146 MMHG

## 2025-05-28 DIAGNOSIS — N39.0 URINARY TRACT INFECTION, SITE NOT SPECIFIED: ICD-10-CM

## 2025-05-28 PROBLEM — N32.89 BLADDER MASS: Status: ACTIVE | Noted: 2025-05-28

## 2025-05-28 PROCEDURE — 76770 US EXAM ABDO BACK WALL COMP: CPT

## 2025-05-28 PROCEDURE — 99204 OFFICE O/P NEW MOD 45 MIN: CPT

## 2025-05-28 RX ORDER — GABAPENTIN 300 MG
300 TABLET ORAL
Refills: 0 | Status: ACTIVE | COMMUNITY

## 2025-05-29 ENCOUNTER — NON-APPOINTMENT (OUTPATIENT)
Age: 75
End: 2025-05-29

## 2025-05-29 ENCOUNTER — APPOINTMENT (OUTPATIENT)
Dept: INTERNAL MEDICINE | Facility: CLINIC | Age: 75
End: 2025-05-29
Payer: MEDICARE

## 2025-05-29 VITALS
HEIGHT: 64 IN | BODY MASS INDEX: 25.78 KG/M2 | WEIGHT: 151 LBS | DIASTOLIC BLOOD PRESSURE: 80 MMHG | HEART RATE: 135 BPM | TEMPERATURE: 98.4 F | OXYGEN SATURATION: 96 % | SYSTOLIC BLOOD PRESSURE: 132 MMHG | RESPIRATION RATE: 15 BRPM

## 2025-05-29 DIAGNOSIS — I10 ESSENTIAL (PRIMARY) HYPERTENSION: ICD-10-CM

## 2025-05-29 DIAGNOSIS — F41.9 ANXIETY DISORDER, UNSPECIFIED: ICD-10-CM

## 2025-05-29 DIAGNOSIS — Z12.39 ENCOUNTER FOR OTHER SCREENING FOR MALIGNANT NEOPLASM OF BREAST: ICD-10-CM

## 2025-05-29 DIAGNOSIS — R39.15 URGENCY OF URINATION: ICD-10-CM

## 2025-05-29 DIAGNOSIS — G35 MULTIPLE SCLEROSIS: ICD-10-CM

## 2025-05-29 LAB
APPEARANCE: ABNORMAL
BACTERIA: NEGATIVE /HPF
BILIRUBIN URINE: ABNORMAL
BLOOD URINE: ABNORMAL
CAST: 2 /LPF
COARSE GRANULAR CASTS: PRESENT
COLOR: ABNORMAL
EPITHELIAL CELLS: 7 /HPF
GLUCOSE QUALITATIVE U: NEGATIVE MG/DL
HYALINE CASTS: PRESENT
KETONES URINE: NEGATIVE MG/DL
LEUKOCYTE ESTERASE URINE: ABNORMAL
MICROSCOPIC-UA: NORMAL
NITRITE URINE: POSITIVE
PH URINE: 5
PROTEIN URINE: 100 MG/DL
RED BLOOD CELLS URINE: >1900 /HPF
REVIEW: NORMAL
SPECIFIC GRAVITY URINE: 1.02
UROBILINOGEN URINE: 0.2 MG/DL
WHITE BLOOD CELLS URINE: 68 /HPF

## 2025-05-29 PROCEDURE — G0439: CPT

## 2025-05-29 PROCEDURE — 93000 ELECTROCARDIOGRAM COMPLETE: CPT

## 2025-05-29 RX ORDER — VALSARTAN AND HYDROCHLOROTHIAZIDE 320; 25 MG/1; MG/1
320-25 TABLET, FILM COATED ORAL
Qty: 30 | Refills: 11 | Status: ACTIVE | COMMUNITY
Start: 2025-05-29

## 2025-05-29 RX ORDER — ALPRAZOLAM 0.25 MG/1
0.25 TABLET ORAL
Refills: 0 | Status: ACTIVE | COMMUNITY
Start: 2025-05-29

## 2025-05-29 RX ORDER — PHENAZOPYRIDINE 100 MG/1
100 TABLET, FILM COATED ORAL
Qty: 20 | Refills: 0 | Status: ACTIVE | COMMUNITY
Start: 2025-05-29 | End: 1900-01-01

## 2025-05-29 RX ORDER — GABAPENTIN 100 MG/1
100 CAPSULE ORAL
Qty: 90 | Refills: 2 | Status: ACTIVE | COMMUNITY
Start: 2025-05-29

## 2025-05-30 ENCOUNTER — RESULT REVIEW (OUTPATIENT)
Age: 75
End: 2025-05-30

## 2025-05-30 LAB — BACTERIA UR CULT: NORMAL

## 2025-05-31 DIAGNOSIS — E78.5 HYPERLIPIDEMIA, UNSPECIFIED: ICD-10-CM

## 2025-05-31 DIAGNOSIS — R74.8 ABNORMAL LEVELS OF OTHER SERUM ENZYMES: ICD-10-CM

## 2025-05-31 LAB
25(OH)D3 SERPL-MCNC: 26.5 NG/ML
ALBUMIN SERPL ELPH-MCNC: 4.6 G/DL
ALP BLD-CCNC: 136 U/L
ALT SERPL-CCNC: 21 U/L
ANION GAP SERPL CALC-SCNC: 16 MMOL/L
AST SERPL-CCNC: 24 U/L
BASOPHILS # BLD AUTO: 0.06 K/UL
BASOPHILS NFR BLD AUTO: 0.7 %
BILIRUB SERPL-MCNC: 0.3 MG/DL
BUN SERPL-MCNC: 19 MG/DL
CALCIUM SERPL-MCNC: 10.7 MG/DL
CHLORIDE SERPL-SCNC: 100 MMOL/L
CHOLEST SERPL-MCNC: 242 MG/DL
CO2 SERPL-SCNC: 24 MMOL/L
CREAT SERPL-MCNC: 0.79 MG/DL
EGFRCR SERPLBLD CKD-EPI 2021: 78 ML/MIN/1.73M2
EOSINOPHIL # BLD AUTO: 0.15 K/UL
EOSINOPHIL NFR BLD AUTO: 1.8 %
ESTIMATED AVERAGE GLUCOSE: 108 MG/DL
FERRITIN SERPL-MCNC: 78 NG/ML
FOLATE SERPL-MCNC: 3.8 NG/ML
GLUCOSE SERPL-MCNC: 110 MG/DL
HBA1C MFR BLD HPLC: 5.4 %
HCT VFR BLD CALC: 37.4 %
HCV AB SER QL: NONREACTIVE
HCV S/CO RATIO: 0.14 S/CO
HDLC SERPL-MCNC: 58 MG/DL
HGB BLD-MCNC: 12 G/DL
IMM GRANULOCYTES NFR BLD AUTO: 0.1 %
IRON SATN MFR SERPL: 12 %
IRON SERPL-MCNC: 46 UG/DL
LDLC SERPL-MCNC: 149 MG/DL
LYMPHOCYTES # BLD AUTO: 1.63 K/UL
LYMPHOCYTES NFR BLD AUTO: 19 %
MAN DIFF?: NORMAL
MCHC RBC-ENTMCNC: 30.3 PG
MCHC RBC-ENTMCNC: 32.1 G/DL
MCV RBC AUTO: 94.4 FL
MONOCYTES # BLD AUTO: 0.51 K/UL
MONOCYTES NFR BLD AUTO: 6 %
NEUTROPHILS # BLD AUTO: 6.21 K/UL
NEUTROPHILS NFR BLD AUTO: 72.4 %
NONHDLC SERPL-MCNC: 185 MG/DL
PLATELET # BLD AUTO: 489 K/UL
POTASSIUM SERPL-SCNC: 3.6 MMOL/L
PROT SERPL-MCNC: 7.1 G/DL
RBC # BLD: 3.96 M/UL
RBC # FLD: 14.4 %
SODIUM SERPL-SCNC: 140 MMOL/L
TIBC SERPL-MCNC: 374 UG/DL
TRIGL SERPL-MCNC: 199 MG/DL
TSH SERPL-ACNC: 0.58 UIU/ML
UIBC SERPL-MCNC: 328 UG/DL
VIT B12 SERPL-MCNC: 393 PG/ML
WBC # FLD AUTO: 8.57 K/UL

## 2025-05-31 RX ORDER — ATORVASTATIN CALCIUM 10 MG/1
10 TABLET, FILM COATED ORAL
Qty: 90 | Refills: 1 | Status: ACTIVE | COMMUNITY
Start: 2025-05-31 | End: 1900-01-01

## 2025-06-02 ENCOUNTER — OUTPATIENT (OUTPATIENT)
Dept: OUTPATIENT SERVICES | Facility: HOSPITAL | Age: 75
LOS: 1 days | End: 2025-06-02
Payer: MEDICARE

## 2025-06-02 ENCOUNTER — APPOINTMENT (OUTPATIENT)
Dept: CT IMAGING | Facility: CLINIC | Age: 75
End: 2025-06-02
Payer: MEDICARE

## 2025-06-02 DIAGNOSIS — N32.89 OTHER SPECIFIED DISORDERS OF BLADDER: ICD-10-CM

## 2025-06-02 LAB — URINE CYTOLOGY: NORMAL

## 2025-06-02 PROCEDURE — 74178 CT ABD&PLV WO CNTR FLWD CNTR: CPT

## 2025-06-02 PROCEDURE — 74178 CT ABD&PLV WO CNTR FLWD CNTR: CPT | Mod: 26

## 2025-06-06 ENCOUNTER — APPOINTMENT (OUTPATIENT)
Dept: UROLOGY | Facility: CLINIC | Age: 75
End: 2025-06-06
Payer: MEDICARE

## 2025-06-06 VITALS
SYSTOLIC BLOOD PRESSURE: 164 MMHG | DIASTOLIC BLOOD PRESSURE: 95 MMHG | HEART RATE: 100 BPM | RESPIRATION RATE: 16 BRPM | OXYGEN SATURATION: 93 %

## 2025-06-06 PROCEDURE — 52000 CYSTOURETHROSCOPY: CPT

## 2025-06-09 ENCOUNTER — APPOINTMENT (OUTPATIENT)
Dept: INTERNAL MEDICINE | Facility: CLINIC | Age: 75
End: 2025-06-09

## 2025-06-10 ENCOUNTER — APPOINTMENT (OUTPATIENT)
Dept: UROLOGY | Facility: CLINIC | Age: 75
End: 2025-06-10
Payer: MEDICARE

## 2025-06-10 ENCOUNTER — OUTPATIENT (OUTPATIENT)
Dept: OUTPATIENT SERVICES | Facility: HOSPITAL | Age: 75
LOS: 1 days | End: 2025-06-10
Payer: MEDICARE

## 2025-06-10 VITALS
HEART RATE: 93 BPM | HEIGHT: 64 IN | DIASTOLIC BLOOD PRESSURE: 64 MMHG | SYSTOLIC BLOOD PRESSURE: 135 MMHG | BODY MASS INDEX: 25.78 KG/M2 | TEMPERATURE: 97.9 F | RESPIRATION RATE: 17 BRPM | WEIGHT: 151 LBS

## 2025-06-10 PROCEDURE — 52000 CYSTOURETHROSCOPY: CPT

## 2025-06-12 ENCOUNTER — OUTPATIENT (OUTPATIENT)
Dept: OUTPATIENT SERVICES | Facility: HOSPITAL | Age: 75
LOS: 1 days | End: 2025-06-12

## 2025-06-12 ENCOUNTER — APPOINTMENT (OUTPATIENT)
Dept: INTERNAL MEDICINE | Facility: CLINIC | Age: 75
End: 2025-06-12
Payer: MEDICARE

## 2025-06-12 VITALS
TEMPERATURE: 97.9 F | RESPIRATION RATE: 15 BRPM | WEIGHT: 150 LBS | HEIGHT: 64 IN | OXYGEN SATURATION: 97 % | DIASTOLIC BLOOD PRESSURE: 78 MMHG | BODY MASS INDEX: 25.61 KG/M2 | SYSTOLIC BLOOD PRESSURE: 130 MMHG | HEART RATE: 86 BPM

## 2025-06-12 VITALS
TEMPERATURE: 98 F | OXYGEN SATURATION: 97 % | HEIGHT: 62 IN | DIASTOLIC BLOOD PRESSURE: 66 MMHG | WEIGHT: 147.05 LBS | SYSTOLIC BLOOD PRESSURE: 112 MMHG | RESPIRATION RATE: 16 BRPM | HEART RATE: 83 BPM

## 2025-06-12 DIAGNOSIS — Z98.890 OTHER SPECIFIED POSTPROCEDURAL STATES: Chronic | ICD-10-CM

## 2025-06-12 DIAGNOSIS — N32.89 OTHER SPECIFIED DISORDERS OF BLADDER: ICD-10-CM

## 2025-06-12 DIAGNOSIS — Z90.89 ACQUIRED ABSENCE OF OTHER ORGANS: Chronic | ICD-10-CM

## 2025-06-12 PROBLEM — Z01.818 PREOP EXAMINATION: Status: ACTIVE | Noted: 2025-06-12

## 2025-06-12 PROCEDURE — 93000 ELECTROCARDIOGRAM COMPLETE: CPT

## 2025-06-12 PROCEDURE — 99214 OFFICE O/P EST MOD 30 MIN: CPT

## 2025-06-12 PROCEDURE — G2211 COMPLEX E/M VISIT ADD ON: CPT

## 2025-06-12 RX ORDER — SODIUM CHLORIDE 9 G/1000ML
1000 INJECTION, SOLUTION INTRAVENOUS
Refills: 0 | Status: DISCONTINUED | OUTPATIENT
Start: 2025-06-16 | End: 2025-06-30

## 2025-06-12 RX ORDER — GABAPENTIN 400 MG/1
1 CAPSULE ORAL
Refills: 0 | DISCHARGE

## 2025-06-12 RX ORDER — PHENAZOPYRIDINE HYDROCHLORIDE 100 MG/1
100 TABLET ORAL EVERY 8 HOURS
Qty: 30 | Refills: 0 | Status: DISCONTINUED | COMMUNITY
Start: 2025-06-03 | End: 2025-06-12

## 2025-06-12 NOTE — H&P PST ADULT - NEGATIVE ENMT SYMPTOMS
no ear pain/no tinnitus/no vertigo/no sinus symptoms/no nasal congestion/no nasal discharge/no post-nasal discharge/no nose bleeds/no gum bleeding/no dry mouth/no dysphagia

## 2025-06-12 NOTE — H&P PST ADULT - HISTORY OF PRESENT ILLNESS
burning, urine discoloration on 05/18/25 went to [place] ED, then F/U with urologist / S/P Ct scan  - blood clot  was a tumor  73 y/o F presents for pre op evaluation with c/o dysuria and urine discoloration on 05/18/25, pt went  to [place] ED, then F/U with urologist Dr. Chen "Bladder mass found on cystoscopy w/ Dr Chen during workup for gross hematuria". Pt was then referred to surgeon for consultation and treatment. S/P Cystoscopy 6/10/25 showed posterior bladder wall lesion, consistent w/ CT urogram. Now  Schedule for cystoscopy, Transurethral Resection of Bladder tumor tentatively on 06/16/25.

## 2025-06-12 NOTE — H&P PST ADULT - LAST STRESS TEST
Report received from night RN. assumed patient care at 0715. Pt A&OX4. Not in any form of distress at this time. family at bedside. VSS, Assessment completed. No heavy bleeding noted. Pt breast feeding. Call light within reach, personal belongings available, bed in lowest position, hourly rounding in place. Pt educated in importance of calling for assistance. Medications given as ordered (see MAR), reviewed POC with pt. No additional needs at this time.             denies

## 2025-06-12 NOTE — H&P PST ADULT - MUSCULOSKELETAL
details… no joint swelling/no joint erythema/no joint warmth/no calf tenderness/decreased ROM/abnormal gait

## 2025-06-12 NOTE — H&P PST ADULT - PROBLEM SELECTOR PLAN 1
Schedule for cystoscopy, Transurethral Resection of Bladder tumor tentatively on 06/16/25. Pre op  instructions given and explained. Pt verbalized understanding.    CBC, CMP, Urine culture done on 05/28/2025 - no growth (in Allscript)

## 2025-06-13 NOTE — ASU PATIENT PROFILE, ADULT - VISION (WITH CORRECTIVE LENSES IF THE PATIENT USUALLY WEARS THEM):
distance and reading/Severely impaired: cannot locate objects without hearing or touching them or patient nonresponsive.

## 2025-06-16 ENCOUNTER — OUTPATIENT (OUTPATIENT)
Dept: INPATIENT UNIT | Facility: HOSPITAL | Age: 75
LOS: 1 days | End: 2025-06-16
Payer: MEDICARE

## 2025-06-16 ENCOUNTER — APPOINTMENT (OUTPATIENT)
Dept: UROLOGY | Facility: HOSPITAL | Age: 75
End: 2025-06-16

## 2025-06-16 ENCOUNTER — RESULT REVIEW (OUTPATIENT)
Age: 75
End: 2025-06-16

## 2025-06-16 ENCOUNTER — TRANSCRIPTION ENCOUNTER (OUTPATIENT)
Age: 75
End: 2025-06-16

## 2025-06-16 VITALS
HEART RATE: 95 BPM | RESPIRATION RATE: 16 BRPM | TEMPERATURE: 98 F | OXYGEN SATURATION: 94 % | DIASTOLIC BLOOD PRESSURE: 74 MMHG | SYSTOLIC BLOOD PRESSURE: 119 MMHG

## 2025-06-16 VITALS
HEART RATE: 89 BPM | WEIGHT: 147.05 LBS | DIASTOLIC BLOOD PRESSURE: 81 MMHG | TEMPERATURE: 98 F | RESPIRATION RATE: 17 BRPM | SYSTOLIC BLOOD PRESSURE: 132 MMHG | HEIGHT: 64 IN

## 2025-06-16 DIAGNOSIS — Z98.890 OTHER SPECIFIED POSTPROCEDURAL STATES: Chronic | ICD-10-CM

## 2025-06-16 DIAGNOSIS — Z90.89 ACQUIRED ABSENCE OF OTHER ORGANS: Chronic | ICD-10-CM

## 2025-06-16 DIAGNOSIS — N32.89 OTHER SPECIFIED DISORDERS OF BLADDER: ICD-10-CM

## 2025-06-16 PROCEDURE — 88307 TISSUE EXAM BY PATHOLOGIST: CPT | Mod: 26

## 2025-06-16 PROCEDURE — 52240 CYSTOSCOPY AND TREATMENT: CPT

## 2025-06-16 RX ORDER — ONDANSETRON HCL/PF 4 MG/2 ML
4 VIAL (ML) INJECTION ONCE
Refills: 0 | Status: DISCONTINUED | OUTPATIENT
Start: 2025-06-16 | End: 2025-06-30

## 2025-06-16 RX ORDER — HYDROMORPHONE/SOD CHLOR,ISO/PF 2 MG/10 ML
0.25 SYRINGE (ML) INJECTION
Refills: 0 | Status: DISCONTINUED | OUTPATIENT
Start: 2025-06-16 | End: 2025-06-16

## 2025-06-16 RX ORDER — HYDROMORPHONE/SOD CHLOR,ISO/PF 2 MG/10 ML
0.5 SYRINGE (ML) INJECTION
Refills: 0 | Status: DISCONTINUED | OUTPATIENT
Start: 2025-06-16 | End: 2025-06-16

## 2025-06-16 RX ORDER — OXYCODONE HYDROCHLORIDE 30 MG/1
5 TABLET ORAL ONCE
Refills: 0 | Status: DISCONTINUED | OUTPATIENT
Start: 2025-06-16 | End: 2025-06-16

## 2025-06-16 RX ORDER — SERTRALINE 100 MG/1
2 TABLET, FILM COATED ORAL
Refills: 0 | DISCHARGE

## 2025-06-16 RX ORDER — GABAPENTIN 400 MG/1
2 CAPSULE ORAL
Refills: 0 | DISCHARGE

## 2025-06-16 RX ORDER — ATORVASTATIN CALCIUM 80 MG/1
1 TABLET, FILM COATED ORAL
Refills: 0 | DISCHARGE

## 2025-06-16 RX ORDER — SODIUM CHLORIDE 9 G/1000ML
500 INJECTION, SOLUTION INTRAVENOUS ONCE
Refills: 0 | Status: COMPLETED | OUTPATIENT
Start: 2025-06-16 | End: 2025-06-16

## 2025-06-16 RX ADMIN — OXYCODONE HYDROCHLORIDE 5 MILLIGRAM(S): 30 TABLET ORAL at 16:43

## 2025-06-16 RX ADMIN — SODIUM CHLORIDE 500 MILLILITER(S): 9 INJECTION, SOLUTION INTRAVENOUS at 17:55

## 2025-06-16 RX ADMIN — Medication 0.25 MILLIGRAM(S): at 16:07

## 2025-06-16 RX ADMIN — OXYCODONE HYDROCHLORIDE 5 MILLIGRAM(S): 30 TABLET ORAL at 16:16

## 2025-06-16 RX ADMIN — Medication 0.25 MILLIGRAM(S): at 15:37

## 2025-06-16 RX ADMIN — SODIUM CHLORIDE 30 MILLILITER(S): 9 INJECTION, SOLUTION INTRAVENOUS at 15:37

## 2025-06-16 NOTE — BRIEF OPERATIVE NOTE - OPERATION/FINDINGS
Cystoscopy performed, bilateral ureteral orifices identified in normal orthotopic position. Large bladder tumor identified at R posterior wall, resected with loop electrocautery and sent for pathology, base fulgurated, hemostasis achieved. No additional mucosal abnormalities identified. Ureteral orifices not involved with resection.

## 2025-06-16 NOTE — ASU DISCHARGE PLAN (ADULT/PEDIATRIC) - CARE PROVIDER_API CALL
Sergey Noonan  Urology  [address] [place] [address], [place] [address]  [place] [address]  Phone: [phone]  Fax: [phone]  Follow Up Time: Routine

## 2025-06-16 NOTE — ASU DISCHARGE PLAN (ADULT/PEDIATRIC) - ASU DC SPECIAL INSTRUCTIONSFT
CATHETER: You are going home with a clemente catheter. If you still have a catheter, the nurses will review instructions and care before you go home.   GENERAL: It is common to have blood in your urine after your procedure. It may be pink or even red; inform your doctor if you have a significant amount of clot in the urine or if you are unable to void at all or if your catheter stops draining. The urine may clear and then become bloody again especially as you are more physically active. It is not uncommon to have some burning when you urinate, this will gradually improve. With a catheter in place, it is not uncommon to have occasional leakage or urine or blood around the catheter. Please call your urologist if this is excessive and/or the urine is not draining through the catheter into the bag.  BATHING: You may shower or bathe.   DIET: You may resume your regular diet and regular medication regimen.  PAIN: You may take Tylenol (acetaminophen) 650-975mg and/or Motrin (ibuprofen) 400-600mg, both available over the counter, for pain every 6 hours as needed. Do not exceed 4000mg of Tylenol (acetaminophen) daily. You may alternate these medications such that you take one or the other every 3 hours for around the clock pain coverage.  STOOL SOFTENERS: Do not allow yourself to become constipated as straining may cause bleeding. Take stool softeners or a laxative (ex. Miralax, Colace, Senokot, ExLax, etc), available over the counter, if needed.  ACTIVITY: No heavy lifting or strenuous exercise until you are evaluated at your post-operative appointment. Otherwise, you may return to your usual level of physical activity.  FOLLOW-UP: If you did not already schedule your post-operative appointment, please call your urologist to schedule a follow-up appointment.  CALL YOUR UROLOGIST IF: You have any bleeding that does not stop, inability to void >8 hours, fever over 100.4 F, chills, persistent nausea/vomiting, changes in your incision concerning for infection, or if your pain is not controlled on your discharge pain medications.

## 2025-06-16 NOTE — ASU DISCHARGE PLAN (ADULT/PEDIATRIC) - FINANCIAL ASSISTANCE
[facility] provides services at a reduced cost to those who are determined to be eligible through [facility]’s financial assistance program. Information regarding [facility]’s financial assistance program can be found by going to [email or web address] or by calling [phone].

## 2025-06-17 ENCOUNTER — APPOINTMENT (OUTPATIENT)
Dept: UROLOGY | Facility: CLINIC | Age: 75
End: 2025-06-17
Payer: MEDICARE

## 2025-06-17 PROCEDURE — 51798 US URINE CAPACITY MEASURE: CPT

## 2025-06-17 PROCEDURE — 99212 OFFICE O/P EST SF 10 MIN: CPT | Mod: 25

## 2025-06-19 LAB — SURGICAL PATHOLOGY STUDY: SIGNIFICANT CHANGE UP

## 2025-06-20 PROBLEM — I10 ESSENTIAL (PRIMARY) HYPERTENSION: Chronic | Status: ACTIVE | Noted: 2025-06-12

## 2025-06-23 ENCOUNTER — APPOINTMENT (OUTPATIENT)
Dept: UROLOGY | Facility: HOSPITAL | Age: 75
End: 2025-06-23

## 2025-06-26 ENCOUNTER — APPOINTMENT (OUTPATIENT)
Dept: CT IMAGING | Facility: CLINIC | Age: 75
End: 2025-06-26
Payer: MEDICARE

## 2025-06-26 ENCOUNTER — OUTPATIENT (OUTPATIENT)
Dept: OUTPATIENT SERVICES | Facility: HOSPITAL | Age: 75
LOS: 1 days | End: 2025-06-26
Payer: MEDICARE

## 2025-06-26 ENCOUNTER — APPOINTMENT (OUTPATIENT)
Dept: MRI IMAGING | Facility: CLINIC | Age: 75
End: 2025-06-26
Payer: MEDICARE

## 2025-06-26 DIAGNOSIS — Z98.890 OTHER SPECIFIED POSTPROCEDURAL STATES: Chronic | ICD-10-CM

## 2025-06-26 DIAGNOSIS — C79.51 SECONDARY MALIGNANT NEOPLASM OF BONE: ICD-10-CM

## 2025-06-26 DIAGNOSIS — Z90.89 ACQUIRED ABSENCE OF OTHER ORGANS: Chronic | ICD-10-CM

## 2025-06-26 DIAGNOSIS — C67.4 MALIGNANT NEOPLASM OF POSTERIOR WALL OF BLADDER: ICD-10-CM

## 2025-06-26 PROCEDURE — 72197 MRI PELVIS W/O & W/DYE: CPT | Mod: 26

## 2025-06-26 PROCEDURE — 71250 CT THORAX DX C-: CPT

## 2025-06-26 PROCEDURE — 72197 MRI PELVIS W/O & W/DYE: CPT

## 2025-06-26 PROCEDURE — 71250 CT THORAX DX C-: CPT | Mod: 26

## 2025-06-26 PROCEDURE — A9585: CPT

## 2025-07-02 ENCOUNTER — OUTPATIENT (OUTPATIENT)
Dept: OUTPATIENT SERVICES | Facility: HOSPITAL | Age: 75
LOS: 1 days | Discharge: HOME | End: 2025-07-02

## 2025-07-02 DIAGNOSIS — C67.0 MALIGNANT NEOPLASM OF TRIGONE OF BLADDER: ICD-10-CM

## 2025-07-02 DIAGNOSIS — Z98.890 OTHER SPECIFIED POSTPROCEDURAL STATES: Chronic | ICD-10-CM

## 2025-07-02 DIAGNOSIS — Z90.89 ACQUIRED ABSENCE OF OTHER ORGANS: Chronic | ICD-10-CM

## 2025-07-03 ENCOUNTER — RESULT REVIEW (OUTPATIENT)
Age: 75
End: 2025-07-03

## 2025-07-03 ENCOUNTER — APPOINTMENT (OUTPATIENT)
Dept: HEMATOLOGY ONCOLOGY | Facility: CLINIC | Age: 75
End: 2025-07-03
Payer: MEDICARE

## 2025-07-03 ENCOUNTER — APPOINTMENT (OUTPATIENT)
Dept: RADIATION ONCOLOGY | Facility: CLINIC | Age: 75
End: 2025-07-03
Payer: MEDICARE

## 2025-07-03 VITALS
OXYGEN SATURATION: 95 % | HEART RATE: 80 BPM | DIASTOLIC BLOOD PRESSURE: 82 MMHG | SYSTOLIC BLOOD PRESSURE: 139 MMHG | RESPIRATION RATE: 16 BRPM

## 2025-07-03 VITALS
DIASTOLIC BLOOD PRESSURE: 72 MMHG | TEMPERATURE: 98 F | SYSTOLIC BLOOD PRESSURE: 134 MMHG | BODY MASS INDEX: 25.27 KG/M2 | HEIGHT: 64 IN | RESPIRATION RATE: 17 BRPM | OXYGEN SATURATION: 94 % | HEART RATE: 82 BPM | WEIGHT: 148 LBS

## 2025-07-03 LAB
BASOPHILS # BLD AUTO: 0.06 K/UL — SIGNIFICANT CHANGE UP (ref 0–0.2)
BASOPHILS NFR BLD AUTO: 0.6 % — SIGNIFICANT CHANGE UP (ref 0–2)
EOSINOPHIL # BLD AUTO: 0.11 K/UL — SIGNIFICANT CHANGE UP (ref 0–0.5)
EOSINOPHIL NFR BLD AUTO: 1 % — SIGNIFICANT CHANGE UP (ref 0–6)
HCT VFR BLD CALC: 35.8 % — SIGNIFICANT CHANGE UP (ref 34.5–45)
HGB BLD-MCNC: 12 G/DL — SIGNIFICANT CHANGE UP (ref 11.5–15.5)
IMM GRANULOCYTES NFR BLD AUTO: 0.4 % — SIGNIFICANT CHANGE UP (ref 0–0.9)
LYMPHOCYTES # BLD AUTO: 1.5 K/UL — SIGNIFICANT CHANGE UP (ref 1–3.3)
LYMPHOCYTES # BLD AUTO: 13.9 % — SIGNIFICANT CHANGE UP (ref 13–44)
MCHC RBC-ENTMCNC: 30.2 PG — SIGNIFICANT CHANGE UP (ref 27–34)
MCHC RBC-ENTMCNC: 33.5 G/DL — SIGNIFICANT CHANGE UP (ref 32–36)
MCV RBC AUTO: 89.9 FL — SIGNIFICANT CHANGE UP (ref 80–100)
MONOCYTES # BLD AUTO: 0.65 K/UL — SIGNIFICANT CHANGE UP (ref 0–0.9)
MONOCYTES NFR BLD AUTO: 6 % — SIGNIFICANT CHANGE UP (ref 2–14)
NEUTROPHILS # BLD AUTO: 8.46 K/UL — HIGH (ref 1.8–7.4)
NEUTROPHILS NFR BLD AUTO: 78.1 % — HIGH (ref 43–77)
NRBC BLD AUTO-RTO: 0 /100 WBCS — SIGNIFICANT CHANGE UP (ref 0–0)
PLATELET # BLD AUTO: 402 K/UL — HIGH (ref 150–400)
RBC # BLD: 3.98 M/UL — SIGNIFICANT CHANGE UP (ref 3.8–5.2)
RBC # FLD: 12.7 % — SIGNIFICANT CHANGE UP (ref 10.3–14.5)
WBC # BLD: 10.82 K/UL — HIGH (ref 3.8–10.5)
WBC # FLD AUTO: 10.82 K/UL — HIGH (ref 3.8–10.5)

## 2025-07-03 PROCEDURE — 99204 OFFICE O/P NEW MOD 45 MIN: CPT

## 2025-07-03 PROCEDURE — 99215 OFFICE O/P EST HI 40 MIN: CPT

## 2025-07-05 LAB
ALBUMIN SERPL ELPH-MCNC: 4.4 G/DL
ALP BLD-CCNC: 123 U/L
ALT SERPL-CCNC: 21 U/L
ANION GAP SERPL CALC-SCNC: 19 MMOL/L
APTT BLD: 28.2 SEC
AST SERPL-CCNC: 25 U/L
BILIRUB SERPL-MCNC: 0.3 MG/DL
BUN SERPL-MCNC: 26 MG/DL
CALCIUM SERPL-MCNC: 10.2 MG/DL
CHLORIDE SERPL-SCNC: 101 MMOL/L
CO2 SERPL-SCNC: 23 MMOL/L
CREAT SERPL-MCNC: 0.97 MG/DL
EGFRCR SERPLBLD CKD-EPI 2021: 61 ML/MIN/1.73M2
GLUCOSE SERPL-MCNC: 108 MG/DL
HBV CORE IGG+IGM SER QL: NONREACTIVE
HBV SURFACE AG SER QL: NONREACTIVE
HCV RNA FLD QL NAA+PROBE: NORMAL
HCV RNA SPEC QL PROBE+SIG AMP: NOT DETECTED
INR PPP: 0.99 RATIO
LDH SERPL-CCNC: 186 U/L
POTASSIUM SERPL-SCNC: 3.4 MMOL/L
PROT SERPL-MCNC: 6.9 G/DL
PT BLD: 11.7 SEC
SODIUM SERPL-SCNC: 143 MMOL/L
TSH SERPL-ACNC: 1.02 UIU/ML

## 2025-07-08 ENCOUNTER — NON-APPOINTMENT (OUTPATIENT)
Age: 75
End: 2025-07-08

## 2025-07-11 ENCOUNTER — APPOINTMENT (OUTPATIENT)
Dept: INTERVENTIONAL RADIOLOGY/VASCULAR | Facility: CLINIC | Age: 75
End: 2025-07-11

## 2025-07-11 PROBLEM — Z78.9 DOES NOT USE ILLICIT DRUGS: Status: ACTIVE | Noted: 2025-07-11

## 2025-07-11 PROBLEM — Z78.9 SOCIAL ALCOHOL USE: Status: ACTIVE | Noted: 2025-07-11

## 2025-07-15 ENCOUNTER — TRANSCRIPTION ENCOUNTER (OUTPATIENT)
Age: 75
End: 2025-07-15

## 2025-07-15 ENCOUNTER — RESULT REVIEW (OUTPATIENT)
Age: 75
End: 2025-07-15

## 2025-07-15 ENCOUNTER — OUTPATIENT (OUTPATIENT)
Dept: OUTPATIENT SERVICES | Facility: HOSPITAL | Age: 75
LOS: 1 days | End: 2025-07-15
Payer: MEDICARE

## 2025-07-15 VITALS
HEART RATE: 83 BPM | OXYGEN SATURATION: 94 % | DIASTOLIC BLOOD PRESSURE: 59 MMHG | SYSTOLIC BLOOD PRESSURE: 114 MMHG | RESPIRATION RATE: 16 BRPM

## 2025-07-15 VITALS
RESPIRATION RATE: 18 BRPM | HEIGHT: 64 IN | WEIGHT: 147.93 LBS | TEMPERATURE: 98 F | HEART RATE: 95 BPM | DIASTOLIC BLOOD PRESSURE: 75 MMHG | OXYGEN SATURATION: 97 % | SYSTOLIC BLOOD PRESSURE: 121 MMHG

## 2025-07-15 DIAGNOSIS — Z98.890 OTHER SPECIFIED POSTPROCEDURAL STATES: Chronic | ICD-10-CM

## 2025-07-15 DIAGNOSIS — C67.4 MALIGNANT NEOPLASM OF POSTERIOR WALL OF BLADDER: ICD-10-CM

## 2025-07-15 DIAGNOSIS — Z90.89 ACQUIRED ABSENCE OF OTHER ORGANS: Chronic | ICD-10-CM

## 2025-07-15 PROCEDURE — C1894: CPT

## 2025-07-15 PROCEDURE — C1769: CPT

## 2025-07-15 PROCEDURE — 76937 US GUIDE VASCULAR ACCESS: CPT | Mod: 26

## 2025-07-15 PROCEDURE — 36561 INSERT TUNNELED CV CATH: CPT | Mod: RT

## 2025-07-15 PROCEDURE — 36561 INSERT TUNNELED CV CATH: CPT

## 2025-07-15 PROCEDURE — 76937 US GUIDE VASCULAR ACCESS: CPT

## 2025-07-15 PROCEDURE — C1788: CPT

## 2025-07-15 PROCEDURE — 77001 FLUOROGUIDE FOR VEIN DEVICE: CPT

## 2025-07-15 PROCEDURE — 77001 FLUOROGUIDE FOR VEIN DEVICE: CPT | Mod: 26

## 2025-07-15 RX ORDER — LIDOCAINE AND PRILOCAINE 25; 25 MG/G; MG/G
2.5-2.5 CREAM TOPICAL
Qty: 1 | Refills: 2 | Status: ACTIVE | COMMUNITY
Start: 2025-07-15 | End: 1900-01-01

## 2025-07-15 RX ORDER — METOCLOPRAMIDE 10 MG/1
10 TABLET ORAL EVERY 6 HOURS
Qty: 80 | Refills: 2 | Status: ACTIVE | COMMUNITY
Start: 2025-07-15 | End: 1900-01-01

## 2025-07-15 NOTE — ASU DISCHARGE PLAN (ADULT/PEDIATRIC) - NURSING INSTRUCTIONS
Please feel free to contact us at [phone] if any problems arise. After 6PM, Monday through Friday, on weekends and on holidays, please call [phone] and ask for the radiology resident on call to be paged.

## 2025-07-15 NOTE — PROCEDURE NOTE - PROCEDURE FINDINGS AND DETAILS
8 Hebrew powerport placed using US and fluoro guidance. TIp in SVC.  EBL=minimal.  FUll report to follow.

## 2025-07-15 NOTE — ASU DISCHARGE PLAN (ADULT/PEDIATRIC) - NS MD DC FALL RISK RISK
For information on Fall & Injury Prevention, visit: [email or web address] OR  [email or web address] OR  https://www.cdc.gov/steadi/patient.html

## 2025-07-15 NOTE — ASU PATIENT PROFILE, ADULT - NSICDXPASTSURGICALHX_GEN_ALL_CORE_FT
PAST SURGICAL HISTORY:  History of colonoscopy     History of decompression of ulnar nerve     History of tonsillectomy

## 2025-07-15 NOTE — ASU DISCHARGE PLAN (ADULT/PEDIATRIC) - FINANCIAL ASSISTANCE
[facility] provides services at a reduced cost to those who are determined to be eligible through [facility]’s financial assistance program. Information regarding [facility]’s financial assistance program can be found by going to [email or web address] or by calling [phone].

## 2025-07-15 NOTE — PRE-ANESTHESIA EVALUATION ADULT - NSANTHDISPORD_GEN_ALL_CORE
Reason for call:   [x] Prior Auth  [] Other:     Caller:  [] Patient  [x] Pharmacy  Name:   Address:   Callback Number:            PACU

## 2025-07-15 NOTE — H&P ADULT - HISTORY OF PRESENT ILLNESS
Interventional Radiology    HPI: 74y Female with bladder ca     Review of Systems:   Constitutional: No fever, weight loss, or fatigue  Neurological: No headaches, memory loss, loss of strength, numbness, or tremors  Respiratory: No cough, wheezing, chills or hemoptysis; No shortness of breath  Cardiovascular: No chest pain, palpitations, dizziness, or leg swelling  Gastrointestinal: No abdominal or epigastric pain. No nausea, vomiting, or hematemesis; No diarrhea or constipation. No melena or hematochezia.  Skin: No itching, burning, rashes, or lesions   Musculoskeletal: No joint pain or swelling; No muscle, back, or extremity pain    Allergies: sulfa drugs (Vomiting)  penicillins (Hives)    Medications (Abx/Cardiac/Anticoagulation/Blood Products)      Data:  162.6  67.1  T(C): 36.6  HR: 95  BP: 121/75  RR: 18  SpO2: 97%            Physical Exam  General: No acute distress, nontoxic, A&Ox3  Chest: Non labored breathing  Abdomen: Non-distended, non-tender, no preitoneal signs  Extremities: No swelling, warm  Skin: No rashes or lesions    RADIOLOGY & ADDITIONAL TESTS:    Imaging Reviewed        Plan: 74y Female presents for chest wall port.  -Risks/Benefits/alternatives explained with the patient and/or healthcare proxy and witnessed informed consent obtained.

## 2025-07-17 ENCOUNTER — APPOINTMENT (OUTPATIENT)
Dept: INFUSION THERAPY | Facility: HOSPITAL | Age: 75
End: 2025-07-17

## 2025-07-17 ENCOUNTER — APPOINTMENT (OUTPATIENT)
Dept: HEMATOLOGY ONCOLOGY | Facility: CLINIC | Age: 75
End: 2025-07-17

## 2025-07-18 DIAGNOSIS — C67.9 MALIGNANT NEOPLASM OF BLADDER, UNSPECIFIED: ICD-10-CM

## 2025-07-18 DIAGNOSIS — Z45.2 ENCOUNTER FOR ADJUSTMENT AND MANAGEMENT OF VASCULAR ACCESS DEVICE: ICD-10-CM

## 2025-07-23 ENCOUNTER — RESULT REVIEW (OUTPATIENT)
Age: 75
End: 2025-07-23

## 2025-07-23 ENCOUNTER — NON-APPOINTMENT (OUTPATIENT)
Age: 75
End: 2025-07-23

## 2025-07-23 ENCOUNTER — APPOINTMENT (OUTPATIENT)
Dept: HEMATOLOGY ONCOLOGY | Facility: CLINIC | Age: 75
End: 2025-07-23

## 2025-07-23 VITALS
HEART RATE: 92 BPM | SYSTOLIC BLOOD PRESSURE: 132 MMHG | RESPIRATION RATE: 17 BRPM | DIASTOLIC BLOOD PRESSURE: 84 MMHG | TEMPERATURE: 97.4 F | WEIGHT: 148.59 LBS | OXYGEN SATURATION: 97 % | BODY MASS INDEX: 25.51 KG/M2

## 2025-07-23 DIAGNOSIS — C79.51 SECONDARY MALIGNANT NEOPLASM OF BONE: ICD-10-CM

## 2025-07-23 LAB
BASOPHILS # BLD AUTO: 0.06 K/UL — SIGNIFICANT CHANGE UP (ref 0–0.2)
BASOPHILS NFR BLD AUTO: 0.8 % — SIGNIFICANT CHANGE UP (ref 0–2)
EOSINOPHIL # BLD AUTO: 0.32 K/UL — SIGNIFICANT CHANGE UP (ref 0–0.5)
EOSINOPHIL NFR BLD AUTO: 4.1 % — SIGNIFICANT CHANGE UP (ref 0–6)
HCT VFR BLD CALC: 37.8 % — SIGNIFICANT CHANGE UP (ref 34.5–45)
HGB BLD-MCNC: 12.7 G/DL — SIGNIFICANT CHANGE UP (ref 11.5–15.5)
IMM GRANULOCYTES NFR BLD AUTO: 0.8 % — SIGNIFICANT CHANGE UP (ref 0–0.9)
LYMPHOCYTES # BLD AUTO: 1.23 K/UL — SIGNIFICANT CHANGE UP (ref 1–3.3)
LYMPHOCYTES # BLD AUTO: 15.9 % — SIGNIFICANT CHANGE UP (ref 13–44)
MCHC RBC-ENTMCNC: 29.3 PG — SIGNIFICANT CHANGE UP (ref 27–34)
MCHC RBC-ENTMCNC: 33.6 G/DL — SIGNIFICANT CHANGE UP (ref 32–36)
MCV RBC AUTO: 87.1 FL — SIGNIFICANT CHANGE UP (ref 80–100)
MONOCYTES # BLD AUTO: 0.63 K/UL — SIGNIFICANT CHANGE UP (ref 0–0.9)
MONOCYTES NFR BLD AUTO: 8.1 % — SIGNIFICANT CHANGE UP (ref 2–14)
NEUTROPHILS # BLD AUTO: 5.44 K/UL — SIGNIFICANT CHANGE UP (ref 1.8–7.4)
NEUTROPHILS NFR BLD AUTO: 70.3 % — SIGNIFICANT CHANGE UP (ref 43–77)
NRBC BLD AUTO-RTO: 0 /100 WBCS — SIGNIFICANT CHANGE UP (ref 0–0)
PLATELET # BLD AUTO: 306 K/UL — SIGNIFICANT CHANGE UP (ref 150–400)
RBC # BLD: 4.34 M/UL — SIGNIFICANT CHANGE UP (ref 3.8–5.2)
RBC # FLD: 12.2 % — SIGNIFICANT CHANGE UP (ref 10.3–14.5)
WBC # BLD: 7.74 K/UL — SIGNIFICANT CHANGE UP (ref 3.8–10.5)
WBC # FLD AUTO: 7.74 K/UL — SIGNIFICANT CHANGE UP (ref 3.8–10.5)

## 2025-07-24 ENCOUNTER — RESULT REVIEW (OUTPATIENT)
Age: 75
End: 2025-07-24

## 2025-07-24 ENCOUNTER — APPOINTMENT (OUTPATIENT)
Dept: INFUSION THERAPY | Facility: HOSPITAL | Age: 75
End: 2025-07-24

## 2025-07-24 ENCOUNTER — APPOINTMENT (OUTPATIENT)
Dept: HEMATOLOGY ONCOLOGY | Facility: CLINIC | Age: 75
End: 2025-07-24

## 2025-07-24 LAB
ALBUMIN SERPL ELPH-MCNC: 4.3 G/DL — SIGNIFICANT CHANGE UP (ref 3.3–5)
ALP SERPL-CCNC: 110 U/L — SIGNIFICANT CHANGE UP (ref 40–120)
ALT FLD-CCNC: 18 U/L — SIGNIFICANT CHANGE UP (ref 10–45)
ANION GAP SERPL CALC-SCNC: 15 MMOL/L — SIGNIFICANT CHANGE UP (ref 5–17)
AST SERPL-CCNC: 19 U/L — SIGNIFICANT CHANGE UP (ref 10–40)
BASOPHILS # BLD AUTO: 0.04 K/UL — SIGNIFICANT CHANGE UP (ref 0–0.2)
BASOPHILS NFR BLD AUTO: 0.6 % — SIGNIFICANT CHANGE UP (ref 0–2)
BILIRUB SERPL-MCNC: 0.2 MG/DL — SIGNIFICANT CHANGE UP (ref 0.2–1.2)
BUN SERPL-MCNC: 29 MG/DL — HIGH (ref 7–23)
CALCIUM SERPL-MCNC: 10 MG/DL — SIGNIFICANT CHANGE UP (ref 8.4–10.5)
CHLORIDE SERPL-SCNC: 101 MMOL/L — SIGNIFICANT CHANGE UP (ref 96–108)
CO2 SERPL-SCNC: 26 MMOL/L — SIGNIFICANT CHANGE UP (ref 22–31)
CREAT SERPL-MCNC: 0.81 MG/DL — SIGNIFICANT CHANGE UP (ref 0.5–1.3)
EGFR: 76 ML/MIN/1.73M2 — SIGNIFICANT CHANGE UP
EGFR: 76 ML/MIN/1.73M2 — SIGNIFICANT CHANGE UP
EOSINOPHIL # BLD AUTO: 0.27 K/UL — SIGNIFICANT CHANGE UP (ref 0–0.5)
EOSINOPHIL NFR BLD AUTO: 4 % — SIGNIFICANT CHANGE UP (ref 0–6)
GLUCOSE SERPL-MCNC: 100 MG/DL — HIGH (ref 70–99)
HCT VFR BLD CALC: 36.2 % — SIGNIFICANT CHANGE UP (ref 34.5–45)
HGB BLD-MCNC: 12.1 G/DL — SIGNIFICANT CHANGE UP (ref 11.5–15.5)
IMM GRANULOCYTES NFR BLD AUTO: 0.7 % — SIGNIFICANT CHANGE UP (ref 0–0.9)
LYMPHOCYTES # BLD AUTO: 0.85 K/UL — LOW (ref 1–3.3)
LYMPHOCYTES # BLD AUTO: 12.6 % — LOW (ref 13–44)
MCHC RBC-ENTMCNC: 29 PG — SIGNIFICANT CHANGE UP (ref 27–34)
MCHC RBC-ENTMCNC: 33.4 G/DL — SIGNIFICANT CHANGE UP (ref 32–36)
MCV RBC AUTO: 86.8 FL — SIGNIFICANT CHANGE UP (ref 80–100)
MONOCYTES # BLD AUTO: 0.59 K/UL — SIGNIFICANT CHANGE UP (ref 0–0.9)
MONOCYTES NFR BLD AUTO: 8.7 % — SIGNIFICANT CHANGE UP (ref 2–14)
NEUTROPHILS # BLD AUTO: 4.95 K/UL — SIGNIFICANT CHANGE UP (ref 1.8–7.4)
NEUTROPHILS NFR BLD AUTO: 73.4 % — SIGNIFICANT CHANGE UP (ref 43–77)
PLATELET # BLD AUTO: 282 K/UL — SIGNIFICANT CHANGE UP (ref 150–400)
POTASSIUM SERPL-MCNC: 3.5 MMOL/L — SIGNIFICANT CHANGE UP (ref 3.5–5.3)
POTASSIUM SERPL-SCNC: 3.5 MMOL/L — SIGNIFICANT CHANGE UP (ref 3.5–5.3)
PROT SERPL-MCNC: 7.4 G/DL — SIGNIFICANT CHANGE UP (ref 6–8.3)
RBC # BLD: 4.17 M/UL — SIGNIFICANT CHANGE UP (ref 3.8–5.2)
RBC # FLD: 12.2 % — SIGNIFICANT CHANGE UP (ref 10.3–14.5)
SODIUM SERPL-SCNC: 142 MMOL/L — SIGNIFICANT CHANGE UP (ref 135–145)
T4 FREE+ TSH PNL SERPL: 0.67 UIU/ML — SIGNIFICANT CHANGE UP (ref 0.27–4.2)
WBC # BLD: 6.75 K/UL — SIGNIFICANT CHANGE UP (ref 3.8–10.5)
WBC # FLD AUTO: 6.75 K/UL — SIGNIFICANT CHANGE UP (ref 3.8–10.5)

## 2025-07-24 RX ORDER — ALPRAZOLAM 0.5 MG
1 TABLET, EXTENDED RELEASE 24 HR ORAL
Refills: 0 | DISCHARGE

## 2025-07-25 ENCOUNTER — NON-APPOINTMENT (OUTPATIENT)
Age: 75
End: 2025-07-25

## 2025-07-25 DIAGNOSIS — Z51.11 ENCOUNTER FOR ANTINEOPLASTIC CHEMOTHERAPY: ICD-10-CM

## 2025-07-25 DIAGNOSIS — R11.2 NAUSEA WITH VOMITING, UNSPECIFIED: ICD-10-CM

## 2025-07-29 ENCOUNTER — APPOINTMENT (OUTPATIENT)
Dept: CT IMAGING | Facility: HOSPITAL | Age: 75
End: 2025-07-29

## 2025-07-31 ENCOUNTER — RESULT REVIEW (OUTPATIENT)
Age: 75
End: 2025-07-31

## 2025-07-31 ENCOUNTER — NON-APPOINTMENT (OUTPATIENT)
Age: 75
End: 2025-07-31

## 2025-07-31 ENCOUNTER — APPOINTMENT (OUTPATIENT)
Dept: HEMATOLOGY ONCOLOGY | Facility: CLINIC | Age: 75
End: 2025-07-31

## 2025-07-31 ENCOUNTER — APPOINTMENT (OUTPATIENT)
Dept: INFUSION THERAPY | Facility: HOSPITAL | Age: 75
End: 2025-07-31

## 2025-07-31 DIAGNOSIS — C67.4 MALIGNANT NEOPLASM OF POSTERIOR WALL OF BLADDER: ICD-10-CM

## 2025-07-31 LAB
BASOPHILS # BLD AUTO: 0.04 K/UL — SIGNIFICANT CHANGE UP (ref 0–0.2)
BASOPHILS NFR BLD AUTO: 0.5 % — SIGNIFICANT CHANGE UP (ref 0–2)
EOSINOPHIL # BLD AUTO: 0.61 K/UL — HIGH (ref 0–0.5)
EOSINOPHIL NFR BLD AUTO: 8.1 % — HIGH (ref 0–6)
GLUCOSE SERPL-MCNC: 85 MG/DL — SIGNIFICANT CHANGE UP (ref 70–99)
HCT VFR BLD CALC: 36.6 % — SIGNIFICANT CHANGE UP (ref 34.5–45)
HGB BLD-MCNC: 12.3 G/DL — SIGNIFICANT CHANGE UP (ref 11.5–15.5)
IMM GRANULOCYTES NFR BLD AUTO: 0.8 % — SIGNIFICANT CHANGE UP (ref 0–0.9)
LYMPHOCYTES # BLD AUTO: 0.94 K/UL — LOW (ref 1–3.3)
LYMPHOCYTES # BLD AUTO: 12.4 % — LOW (ref 13–44)
MCHC RBC-ENTMCNC: 29.1 PG — SIGNIFICANT CHANGE UP (ref 27–34)
MCHC RBC-ENTMCNC: 33.6 G/DL — SIGNIFICANT CHANGE UP (ref 32–36)
MCV RBC AUTO: 86.7 FL — SIGNIFICANT CHANGE UP (ref 80–100)
MONOCYTES # BLD AUTO: 0.71 K/UL — SIGNIFICANT CHANGE UP (ref 0–0.9)
MONOCYTES NFR BLD AUTO: 9.4 % — SIGNIFICANT CHANGE UP (ref 2–14)
NEUTROPHILS # BLD AUTO: 5.2 K/UL — SIGNIFICANT CHANGE UP (ref 1.8–7.4)
NEUTROPHILS NFR BLD AUTO: 68.8 % — SIGNIFICANT CHANGE UP (ref 43–77)
NRBC BLD AUTO-RTO: 0 /100 WBCS — SIGNIFICANT CHANGE UP (ref 0–0)
PLATELET # BLD AUTO: 263 K/UL — SIGNIFICANT CHANGE UP (ref 150–400)
RBC # BLD: 4.22 M/UL — SIGNIFICANT CHANGE UP (ref 3.8–5.2)
RBC # FLD: 12.5 % — SIGNIFICANT CHANGE UP (ref 10.3–14.5)
WBC # BLD: 7.56 K/UL — SIGNIFICANT CHANGE UP (ref 3.8–10.5)
WBC # FLD AUTO: 7.56 K/UL — SIGNIFICANT CHANGE UP (ref 3.8–10.5)

## 2025-08-04 RX ORDER — HYDROCORTISONE 25 MG/G
2.5 CREAM TOPICAL 3 TIMES DAILY
Qty: 2 | Refills: 6 | Status: ACTIVE | COMMUNITY
Start: 2025-08-04 | End: 1900-01-01

## 2025-08-14 ENCOUNTER — RESULT REVIEW (OUTPATIENT)
Age: 75
End: 2025-08-14

## 2025-08-14 ENCOUNTER — APPOINTMENT (OUTPATIENT)
Dept: INFUSION THERAPY | Facility: HOSPITAL | Age: 75
End: 2025-08-14

## 2025-08-14 ENCOUNTER — APPOINTMENT (OUTPATIENT)
Dept: HEMATOLOGY ONCOLOGY | Facility: CLINIC | Age: 75
End: 2025-08-14

## 2025-08-14 ENCOUNTER — APPOINTMENT (OUTPATIENT)
Dept: HEMATOLOGY ONCOLOGY | Facility: CLINIC | Age: 75
End: 2025-08-14
Payer: MEDICARE

## 2025-08-14 DIAGNOSIS — E87.6 HYPOKALEMIA: ICD-10-CM

## 2025-08-14 LAB
ALBUMIN SERPL ELPH-MCNC: 4.3 G/DL — SIGNIFICANT CHANGE UP (ref 3.3–5)
ALP SERPL-CCNC: 83 U/L — SIGNIFICANT CHANGE UP (ref 40–120)
ALT FLD-CCNC: 29 U/L — SIGNIFICANT CHANGE UP (ref 10–45)
ANION GAP SERPL CALC-SCNC: 15 MMOL/L — SIGNIFICANT CHANGE UP (ref 5–17)
APPEARANCE UR: ABNORMAL
AST SERPL-CCNC: 30 U/L — SIGNIFICANT CHANGE UP (ref 10–40)
BACTERIA # UR AUTO: ABNORMAL /HPF
BASOPHILS # BLD AUTO: 0.12 K/UL — SIGNIFICANT CHANGE UP (ref 0–0.2)
BASOPHILS NFR BLD AUTO: 1 % — SIGNIFICANT CHANGE UP (ref 0–2)
BILIRUB SERPL-MCNC: 0.3 MG/DL — SIGNIFICANT CHANGE UP (ref 0.2–1.2)
BILIRUB UR-MCNC: SIGNIFICANT CHANGE UP
BUN SERPL-MCNC: 22 MG/DL — SIGNIFICANT CHANGE UP (ref 7–23)
CALCIUM SERPL-MCNC: 9.9 MG/DL — SIGNIFICANT CHANGE UP (ref 8.4–10.5)
CHLORIDE SERPL-SCNC: 98 MMOL/L — SIGNIFICANT CHANGE UP (ref 96–108)
CO2 SERPL-SCNC: 24 MMOL/L — SIGNIFICANT CHANGE UP (ref 22–31)
COLOR SPEC: ABNORMAL
CREAT SERPL-MCNC: 0.98 MG/DL — SIGNIFICANT CHANGE UP (ref 0.5–1.3)
DIFF PNL FLD: SIGNIFICANT CHANGE UP
EGFR: 60 ML/MIN/1.73M2 — SIGNIFICANT CHANGE UP
EGFR: 60 ML/MIN/1.73M2 — SIGNIFICANT CHANGE UP
EOSINOPHIL # BLD AUTO: 0.84 K/UL — HIGH (ref 0–0.5)
EOSINOPHIL NFR BLD AUTO: 6.9 % — HIGH (ref 0–6)
EPI CELLS # UR: PRESENT
GLUCOSE SERPL-MCNC: 112 MG/DL — HIGH (ref 70–99)
GLUCOSE UR QL: SIGNIFICANT CHANGE UP
HCT VFR BLD CALC: 38.2 % — SIGNIFICANT CHANGE UP (ref 34.5–45)
HGB BLD-MCNC: 13 G/DL — SIGNIFICANT CHANGE UP (ref 11.5–15.5)
HYALINE CASTS # UR AUTO: SIGNIFICANT CHANGE UP
IMM GRANULOCYTES NFR BLD AUTO: 0.8 % — SIGNIFICANT CHANGE UP (ref 0–0.9)
KETONES UR QL: SIGNIFICANT CHANGE UP
LEUKOCYTE ESTERASE UR-ACNC: SIGNIFICANT CHANGE UP
LYMPHOCYTES # BLD AUTO: 1.69 K/UL — SIGNIFICANT CHANGE UP (ref 1–3.3)
LYMPHOCYTES # BLD AUTO: 14 % — SIGNIFICANT CHANGE UP (ref 13–44)
MCHC RBC-ENTMCNC: 29 PG — SIGNIFICANT CHANGE UP (ref 27–34)
MCHC RBC-ENTMCNC: 34 G/DL — SIGNIFICANT CHANGE UP (ref 32–36)
MCV RBC AUTO: 85.1 FL — SIGNIFICANT CHANGE UP (ref 80–100)
MONOCYTES # BLD AUTO: 1.42 K/UL — HIGH (ref 0–0.9)
MONOCYTES NFR BLD AUTO: 11.7 % — SIGNIFICANT CHANGE UP (ref 2–14)
NEUTROPHILS # BLD AUTO: 7.94 K/UL — HIGH (ref 1.8–7.4)
NEUTROPHILS NFR BLD AUTO: 65.6 % — SIGNIFICANT CHANGE UP (ref 43–77)
NITRITE UR-MCNC: SIGNIFICANT CHANGE UP
NRBC BLD AUTO-RTO: 0 /100 WBCS — SIGNIFICANT CHANGE UP (ref 0–0)
PH UR: SIGNIFICANT CHANGE UP (ref 5–8)
PLATELET # BLD AUTO: 448 K/UL — HIGH (ref 150–400)
POTASSIUM SERPL-MCNC: 3.2 MMOL/L — LOW (ref 3.5–5.3)
POTASSIUM SERPL-SCNC: 3.2 MMOL/L — LOW (ref 3.5–5.3)
PROT SERPL-MCNC: 7.4 G/DL — SIGNIFICANT CHANGE UP (ref 6–8.3)
PROT UR-MCNC: SIGNIFICANT CHANGE UP
RBC # BLD: 4.49 M/UL — SIGNIFICANT CHANGE UP (ref 3.8–5.2)
RBC # FLD: 13.4 % — SIGNIFICANT CHANGE UP (ref 10.3–14.5)
RBC CASTS # UR COMP ASSIST: 3 /HPF — SIGNIFICANT CHANGE UP (ref 0–4)
SODIUM SERPL-SCNC: 138 MMOL/L — SIGNIFICANT CHANGE UP (ref 135–145)
SP GR SPEC: SIGNIFICANT CHANGE UP (ref 1–1.03)
T4 FREE+ TSH PNL SERPL: 1.13 UIU/ML — SIGNIFICANT CHANGE UP (ref 0.27–4.2)
UROBILINOGEN FLD QL: SIGNIFICANT CHANGE UP (ref 0.2–1)
WBC # BLD: 12.11 K/UL — HIGH (ref 3.8–10.5)
WBC # FLD AUTO: 12.11 K/UL — HIGH (ref 3.8–10.5)
WBC UR QL: >998 /HPF (ref 0–5)

## 2025-08-14 PROCEDURE — 99204 OFFICE O/P NEW MOD 45 MIN: CPT

## 2025-08-18 PROBLEM — E87.6 HYPOKALEMIA: Status: ACTIVE | Noted: 2025-08-18

## 2025-08-18 LAB
-  AMOXICILLIN/CLAVULANIC ACID: SIGNIFICANT CHANGE UP
-  AMPICILLIN/SULBACTAM: SIGNIFICANT CHANGE UP
-  AMPICILLIN: SIGNIFICANT CHANGE UP
-  AZTREONAM: SIGNIFICANT CHANGE UP
-  CEFAZOLIN: SIGNIFICANT CHANGE UP
-  CEFEPIME: SIGNIFICANT CHANGE UP
-  CEFOXITIN: SIGNIFICANT CHANGE UP
-  CEFTRIAXONE: SIGNIFICANT CHANGE UP
-  CEFUROXIME: SIGNIFICANT CHANGE UP
-  CIPROFLOXACIN: SIGNIFICANT CHANGE UP
-  ERTAPENEM: SIGNIFICANT CHANGE UP
-  GENTAMICIN: SIGNIFICANT CHANGE UP
-  IMIPENEM: SIGNIFICANT CHANGE UP
-  LEVOFLOXACIN: SIGNIFICANT CHANGE UP
-  MEROPENEM: SIGNIFICANT CHANGE UP
-  NITROFURANTOIN: SIGNIFICANT CHANGE UP
-  PIPERACILLIN/TAZOBACTAM: SIGNIFICANT CHANGE UP
-  TIGECYCLINE: SIGNIFICANT CHANGE UP
-  TOBRAMYCIN: SIGNIFICANT CHANGE UP
-  TRIMETHOPRIM/SULFAMETHOXAZOLE: SIGNIFICANT CHANGE UP
CULTURE RESULTS: ABNORMAL
METHOD TYPE: SIGNIFICANT CHANGE UP
ORGANISM # SPEC MICROSCOPIC CNT: ABNORMAL
ORGANISM # SPEC MICROSCOPIC CNT: ABNORMAL
SPECIMEN SOURCE: SIGNIFICANT CHANGE UP

## 2025-08-18 RX ORDER — POTASSIUM CHLORIDE 1500 MG/1
20 TABLET, FILM COATED, EXTENDED RELEASE ORAL
Qty: 40 | Refills: 3 | Status: ACTIVE | COMMUNITY
Start: 2025-08-18 | End: 1900-01-01

## 2025-08-19 RX ORDER — CIPROFLOXACIN HYDROCHLORIDE 500 MG/1
500 TABLET, FILM COATED ORAL TWICE DAILY
Qty: 10 | Refills: 0 | Status: ACTIVE | COMMUNITY
Start: 2025-08-14 | End: 1900-01-01

## 2025-08-21 ENCOUNTER — APPOINTMENT (OUTPATIENT)
Dept: INFUSION THERAPY | Facility: HOSPITAL | Age: 75
End: 2025-08-21

## 2025-08-21 ENCOUNTER — RESULT REVIEW (OUTPATIENT)
Age: 75
End: 2025-08-21

## 2025-08-21 ENCOUNTER — APPOINTMENT (OUTPATIENT)
Dept: HEMATOLOGY ONCOLOGY | Facility: CLINIC | Age: 75
End: 2025-08-21

## 2025-08-21 LAB
ALBUMIN SERPL ELPH-MCNC: 4 G/DL — SIGNIFICANT CHANGE UP (ref 3.3–5)
ALP SERPL-CCNC: 82 U/L — SIGNIFICANT CHANGE UP (ref 40–120)
ALT FLD-CCNC: 32 U/L — SIGNIFICANT CHANGE UP (ref 10–45)
ANION GAP SERPL CALC-SCNC: 15 MMOL/L — SIGNIFICANT CHANGE UP (ref 5–17)
APPEARANCE UR: ABNORMAL
AST SERPL-CCNC: 34 U/L — SIGNIFICANT CHANGE UP (ref 10–40)
BACTERIA # UR AUTO: NEGATIVE /HPF — SIGNIFICANT CHANGE UP
BASOPHILS # BLD AUTO: 0.06 K/UL — SIGNIFICANT CHANGE UP (ref 0–0.2)
BASOPHILS NFR BLD AUTO: 0.7 % — SIGNIFICANT CHANGE UP (ref 0–2)
BILIRUB SERPL-MCNC: 0.3 MG/DL — SIGNIFICANT CHANGE UP (ref 0.2–1.2)
BILIRUB UR-MCNC: NEGATIVE — SIGNIFICANT CHANGE UP
BUN SERPL-MCNC: 19 MG/DL — SIGNIFICANT CHANGE UP (ref 7–23)
CALCIUM SERPL-MCNC: 10 MG/DL — SIGNIFICANT CHANGE UP (ref 8.4–10.5)
CAST: 41 /LPF — HIGH (ref 0–4)
CHLORIDE SERPL-SCNC: 98 MMOL/L — SIGNIFICANT CHANGE UP (ref 96–108)
CO2 SERPL-SCNC: 24 MMOL/L — SIGNIFICANT CHANGE UP (ref 22–31)
COLOR SPEC: SIGNIFICANT CHANGE UP
CREAT SERPL-MCNC: 0.98 MG/DL — SIGNIFICANT CHANGE UP (ref 0.5–1.3)
DIFF PNL FLD: ABNORMAL
EGFR: 60 ML/MIN/1.73M2 — SIGNIFICANT CHANGE UP
EGFR: 60 ML/MIN/1.73M2 — SIGNIFICANT CHANGE UP
EOSINOPHIL # BLD AUTO: 1.01 K/UL — HIGH (ref 0–0.5)
EOSINOPHIL NFR BLD AUTO: 11 % — HIGH (ref 0–6)
GLUCOSE SERPL-MCNC: 100 MG/DL — HIGH (ref 70–99)
GLUCOSE UR QL: NEGATIVE MG/DL — SIGNIFICANT CHANGE UP
HCT VFR BLD CALC: 36.3 % — SIGNIFICANT CHANGE UP (ref 34.5–45)
HGB BLD-MCNC: 12.1 G/DL — SIGNIFICANT CHANGE UP (ref 11.5–15.5)
HYALINE CASTS # UR AUTO: PRESENT
IMM GRANULOCYTES NFR BLD AUTO: 0.9 % — SIGNIFICANT CHANGE UP (ref 0–0.9)
KETONES UR QL: ABNORMAL MG/DL
LEUKOCYTE ESTERASE UR-ACNC: ABNORMAL
LYMPHOCYTES # BLD AUTO: 1.1 K/UL — SIGNIFICANT CHANGE UP (ref 1–3.3)
LYMPHOCYTES # BLD AUTO: 11.9 % — LOW (ref 13–44)
MCHC RBC-ENTMCNC: 28.5 PG — SIGNIFICANT CHANGE UP (ref 27–34)
MCHC RBC-ENTMCNC: 33.3 G/DL — SIGNIFICANT CHANGE UP (ref 32–36)
MCV RBC AUTO: 85.4 FL — SIGNIFICANT CHANGE UP (ref 80–100)
MONOCYTES # BLD AUTO: 1.06 K/UL — HIGH (ref 0–0.9)
MONOCYTES NFR BLD AUTO: 11.5 % — SIGNIFICANT CHANGE UP (ref 2–14)
NEUTROPHILS # BLD AUTO: 5.91 K/UL — SIGNIFICANT CHANGE UP (ref 1.8–7.4)
NEUTROPHILS NFR BLD AUTO: 64 % — SIGNIFICANT CHANGE UP (ref 43–77)
NITRITE UR-MCNC: NEGATIVE — SIGNIFICANT CHANGE UP
NRBC BLD AUTO-RTO: 0 /100 WBCS — SIGNIFICANT CHANGE UP (ref 0–0)
PH UR: 5.5 — SIGNIFICANT CHANGE UP (ref 5–8)
PLATELET # BLD AUTO: 348 K/UL — SIGNIFICANT CHANGE UP (ref 150–400)
POTASSIUM SERPL-MCNC: 4.1 MMOL/L — SIGNIFICANT CHANGE UP (ref 3.5–5.3)
POTASSIUM SERPL-SCNC: 4.1 MMOL/L — SIGNIFICANT CHANGE UP (ref 3.5–5.3)
PROT SERPL-MCNC: 7.2 G/DL — SIGNIFICANT CHANGE UP (ref 6–8.3)
PROT UR-MCNC: 100 MG/DL
RBC # BLD: 4.25 M/UL — SIGNIFICANT CHANGE UP (ref 3.8–5.2)
RBC # FLD: 13.4 % — SIGNIFICANT CHANGE UP (ref 10.3–14.5)
RBC CASTS # UR COMP ASSIST: 2 /HPF — SIGNIFICANT CHANGE UP (ref 0–4)
REVIEW: SIGNIFICANT CHANGE UP
SODIUM SERPL-SCNC: 137 MMOL/L — SIGNIFICANT CHANGE UP (ref 135–145)
SP GR SPEC: 1.02 — SIGNIFICANT CHANGE UP (ref 1–1.03)
SQUAMOUS # UR AUTO: 8 /HPF — HIGH (ref 0–5)
UROBILINOGEN FLD QL: 0.2 MG/DL — SIGNIFICANT CHANGE UP (ref 0.2–1)
WBC # BLD: 9.22 K/UL — SIGNIFICANT CHANGE UP (ref 3.8–10.5)
WBC # FLD AUTO: 9.22 K/UL — SIGNIFICANT CHANGE UP (ref 3.8–10.5)
WBC UR QL: 725 /HPF — HIGH (ref 0–5)

## 2025-08-22 ENCOUNTER — APPOINTMENT (OUTPATIENT)
Dept: RADIATION ONCOLOGY | Facility: CLINIC | Age: 75
End: 2025-08-22

## 2025-08-22 LAB
CULTURE RESULTS: SIGNIFICANT CHANGE UP
SPECIMEN SOURCE: SIGNIFICANT CHANGE UP

## 2025-09-04 ENCOUNTER — APPOINTMENT (OUTPATIENT)
Dept: INFUSION THERAPY | Facility: HOSPITAL | Age: 75
End: 2025-09-04

## 2025-09-04 ENCOUNTER — RESULT REVIEW (OUTPATIENT)
Age: 75
End: 2025-09-04

## 2025-09-04 ENCOUNTER — APPOINTMENT (OUTPATIENT)
Dept: HEMATOLOGY ONCOLOGY | Facility: CLINIC | Age: 75
End: 2025-09-04
Payer: MEDICARE

## 2025-09-04 ENCOUNTER — NON-APPOINTMENT (OUTPATIENT)
Age: 75
End: 2025-09-04

## 2025-09-04 VITALS
HEART RATE: 102 BPM | DIASTOLIC BLOOD PRESSURE: 75 MMHG | SYSTOLIC BLOOD PRESSURE: 126 MMHG | OXYGEN SATURATION: 98 % | RESPIRATION RATE: 17 BRPM | TEMPERATURE: 95 F | BODY MASS INDEX: 25.24 KG/M2 | HEIGHT: 64 IN | WEIGHT: 147.81 LBS

## 2025-09-04 PROCEDURE — 99214 OFFICE O/P EST MOD 30 MIN: CPT

## 2025-09-11 ENCOUNTER — APPOINTMENT (OUTPATIENT)
Dept: HEMATOLOGY ONCOLOGY | Facility: CLINIC | Age: 75
End: 2025-09-11

## 2025-09-11 ENCOUNTER — RESULT REVIEW (OUTPATIENT)
Age: 75
End: 2025-09-11

## 2025-09-11 ENCOUNTER — APPOINTMENT (OUTPATIENT)
Dept: INFUSION THERAPY | Facility: HOSPITAL | Age: 75
End: 2025-09-11

## 2025-09-17 ENCOUNTER — APPOINTMENT (OUTPATIENT)
Dept: CT IMAGING | Facility: CLINIC | Age: 75
End: 2025-09-17
Payer: MEDICARE

## 2025-09-17 PROCEDURE — 71260 CT THORAX DX C+: CPT | Mod: 26

## 2025-09-17 PROCEDURE — 74178 CT ABD&PLV WO CNTR FLWD CNTR: CPT | Mod: 26

## 2025-09-19 ENCOUNTER — APPOINTMENT (OUTPATIENT)
Dept: RADIATION ONCOLOGY | Facility: CLINIC | Age: 75
End: 2025-09-19

## 2025-09-19 DIAGNOSIS — C67.4 MALIGNANT NEOPLASM OF POSTERIOR WALL OF BLADDER: ICD-10-CM

## 2025-09-19 DIAGNOSIS — N32.89 OTHER SPECIFIED DISORDERS OF BLADDER: ICD-10-CM

## 2025-09-19 DIAGNOSIS — C79.51 SECONDARY MALIGNANT NEOPLASM OF BONE: ICD-10-CM

## 2025-09-25 PROBLEM — L50.9 URTICARIA: Status: ACTIVE | Noted: 2025-09-25

## (undated) DEVICE — SOL IRR POUR H2O 1500ML

## (undated) DEVICE — TUBING THERMADX UROLOGY

## (undated) DEVICE — SOL IRR BAG H2O 3000ML

## (undated) DEVICE — VENODYNE/SCD SLEEVE CALF MEDIUM

## (undated) DEVICE — ELCTR GROUNDING PAD ADULT COVIDIEN

## (undated) DEVICE — BAG URINE W METER 2L

## (undated) DEVICE — TUBING LEVEL ONE NORMOFLO SET

## (undated) DEVICE — WARMING BLANKET UPPER ADULT

## (undated) DEVICE — CABLE DAC ACTIVE CORD

## (undated) DEVICE — CANISTER DISPOSABLE THIN WALL 3000CC

## (undated) DEVICE — FOLEY CATH 2-WAY 16FR 5CC LATEX LUBRICATH

## (undated) DEVICE — POSITIONER STRAP ARMBOARD VELCRO TS-30

## (undated) DEVICE — PACK CYSTO

## (undated) DEVICE — SYR TOOMEY 50ML

## (undated) DEVICE — ELCTR PLASMA LOOP MEDIUM ANGLED 24FR 12-30 DEG